# Patient Record
Sex: MALE | Race: WHITE | NOT HISPANIC OR LATINO | Employment: FULL TIME | URBAN - METROPOLITAN AREA
[De-identification: names, ages, dates, MRNs, and addresses within clinical notes are randomized per-mention and may not be internally consistent; named-entity substitution may affect disease eponyms.]

---

## 2017-01-08 DIAGNOSIS — F32.A DEPRESSION: ICD-10-CM

## 2017-01-09 RX ORDER — DULOXETIN HYDROCHLORIDE 60 MG/1
CAPSULE, DELAYED RELEASE ORAL
Qty: 90 CAPSULE | Refills: 0 | Status: SHIPPED | OUTPATIENT
Start: 2017-01-09 | End: 2017-02-08 | Stop reason: SDUPTHER

## 2017-02-08 DIAGNOSIS — F32.A DEPRESSION: ICD-10-CM

## 2017-02-09 RX ORDER — DULOXETIN HYDROCHLORIDE 60 MG/1
CAPSULE, DELAYED RELEASE ORAL
Qty: 90 CAPSULE | Refills: 0 | Status: SHIPPED | OUTPATIENT
Start: 2017-02-09 | End: 2017-03-12 | Stop reason: SDUPTHER

## 2017-03-12 DIAGNOSIS — F32.A DEPRESSION: ICD-10-CM

## 2017-03-13 RX ORDER — DULOXETIN HYDROCHLORIDE 60 MG/1
CAPSULE, DELAYED RELEASE ORAL
Qty: 90 CAPSULE | Refills: 0 | Status: SHIPPED | OUTPATIENT
Start: 2017-03-13 | End: 2017-04-09 | Stop reason: SDUPTHER

## 2017-04-09 DIAGNOSIS — F32.A DEPRESSION: ICD-10-CM

## 2017-04-10 RX ORDER — DULOXETIN HYDROCHLORIDE 60 MG/1
CAPSULE, DELAYED RELEASE ORAL
Qty: 90 CAPSULE | Refills: 0 | Status: SHIPPED | OUTPATIENT
Start: 2017-04-10 | End: 2017-05-08 | Stop reason: SDUPTHER

## 2017-05-08 DIAGNOSIS — F32.A DEPRESSION: ICD-10-CM

## 2017-05-08 RX ORDER — DULOXETIN HYDROCHLORIDE 60 MG/1
CAPSULE, DELAYED RELEASE ORAL
Qty: 90 CAPSULE | Refills: 0 | Status: SHIPPED | OUTPATIENT
Start: 2017-05-08 | End: 2017-08-15 | Stop reason: SDUPTHER

## 2017-08-14 DIAGNOSIS — F32.A DEPRESSION: ICD-10-CM

## 2017-08-14 RX ORDER — DULOXETIN HYDROCHLORIDE 60 MG/1
CAPSULE, DELAYED RELEASE ORAL
Qty: 90 CAPSULE | Refills: 0 | Status: CANCELLED | OUTPATIENT
Start: 2017-08-14

## 2017-08-15 DIAGNOSIS — F32.A DEPRESSION, UNSPECIFIED DEPRESSION TYPE: ICD-10-CM

## 2017-08-15 RX ORDER — DULOXETIN HYDROCHLORIDE 60 MG/1
60 CAPSULE, DELAYED RELEASE ORAL DAILY
Qty: 90 CAPSULE | Refills: 0 | Status: SHIPPED | OUTPATIENT
Start: 2017-08-15 | End: 2018-03-22 | Stop reason: SDUPTHER

## 2018-03-22 DIAGNOSIS — F32.A DEPRESSION, UNSPECIFIED DEPRESSION TYPE: ICD-10-CM

## 2018-03-22 RX ORDER — DULOXETIN HYDROCHLORIDE 60 MG/1
CAPSULE, DELAYED RELEASE ORAL
Qty: 90 CAPSULE | Refills: 0 | Status: SHIPPED | OUTPATIENT
Start: 2018-03-22 | End: 2018-08-16 | Stop reason: SDUPTHER

## 2018-08-16 DIAGNOSIS — F32.A DEPRESSION, UNSPECIFIED DEPRESSION TYPE: ICD-10-CM

## 2018-08-16 RX ORDER — DULOXETIN HYDROCHLORIDE 60 MG/1
CAPSULE, DELAYED RELEASE ORAL
Qty: 90 CAPSULE | Refills: 1 | Status: SHIPPED | OUTPATIENT
Start: 2018-08-16 | End: 2018-11-16 | Stop reason: SDUPTHER

## 2018-10-25 ENCOUNTER — TELEPHONE (OUTPATIENT)
Dept: SPORTS MEDICINE | Facility: CLINIC | Age: 53
End: 2018-10-25

## 2018-11-16 ENCOUNTER — OFFICE VISIT (OUTPATIENT)
Dept: SPORTS MEDICINE | Facility: CLINIC | Age: 53
End: 2018-11-16

## 2018-11-16 VITALS
DIASTOLIC BLOOD PRESSURE: 86 MMHG | WEIGHT: 189.6 LBS | TEMPERATURE: 98.1 F | BODY MASS INDEX: 25.68 KG/M2 | OXYGEN SATURATION: 98 % | HEART RATE: 64 BPM | HEIGHT: 72 IN | SYSTOLIC BLOOD PRESSURE: 122 MMHG

## 2018-11-16 DIAGNOSIS — F32.A DEPRESSION, UNSPECIFIED DEPRESSION TYPE: ICD-10-CM

## 2018-11-16 DIAGNOSIS — Z00.00 ANNUAL PHYSICAL EXAM: Primary | ICD-10-CM

## 2018-11-16 DIAGNOSIS — Z86.711 HISTORY OF PULMONARY EMBOLUS (PE): ICD-10-CM

## 2018-11-16 PROCEDURE — 99396 PREV VISIT EST AGE 40-64: CPT | Performed by: FAMILY MEDICINE

## 2018-11-16 RX ORDER — DULOXETIN HYDROCHLORIDE 60 MG/1
60 CAPSULE, DELAYED RELEASE ORAL DAILY
Qty: 90 CAPSULE | Refills: 3 | Status: SHIPPED | OUTPATIENT
Start: 2018-11-16 | End: 2019-12-31 | Stop reason: SDUPTHER

## 2018-11-16 NOTE — PROGRESS NOTES
"Elías Mccoy is here today for an annual physical exam.     Eating a healthy diet. Exercising routinely.     Was planning to see new cardiologist due to h/o PE Nov 2014, had no predisposing factors. Was told he had a clotting defect and needed Eliquis indefinitely due to that. Has tolerated well without SE.   Depression stable with Cymbalta w/o SE.     Health Maintenance   Topic Date Due   • TDAP/TD VACCINES (1 - Tdap) 05/17/1985   • ZOSTER VACCINE (1 of 2) 05/17/2016   • COLONOSCOPY  08/15/2017   • INFLUENZA VACCINE  08/01/2018   • ANNUAL PHYSICAL  11/17/2019       Review of Systems   Constitutional: Negative.    Respiratory: Negative.    Cardiovascular: Negative.        /86   Pulse 64   Temp 98.1 °F (36.7 °C)   Ht 182.9 cm (72\")   Wt 86 kg (189 lb 9.6 oz)   SpO2 98%   BMI 25.71 kg/m²      Physical Exam    Vital signs reviewed.  General appearance: No acute distress  Eyes: conjunctiva clear without erythema; pupils equally round and reactive  ENT: external ears and nose normal; hearing normal, oropharynx clear  Neck: supple; no thyromegaly  CV: normal rate and rhythm; no peripheral edema  Respiratory: normal respiratory effort; lungs clear to auscultation bilaterally  MSK: normal gait and station; no focal joint deformity or swelling  Skin: no rash or wounds; normal turgor  Neuro: cranial nerves 2-12 grossly intact; normal sensation to light touch  Psych: mood and affect normal; recent and remote memory intact     Elías was seen today for annual exam.    Diagnoses and all orders for this visit:    Annual physical exam  -     Ambulatory Referral For Screening Colonoscopy  -     CBC & Differential  -     Comprehensive Metabolic Panel  -     Lipid Panel With / Chol / HDL Ratio  -     PSA Screen  -     Thyroid Cascade Profile  -     Urinalysis With Culture If Indicated - Urine, Clean Catch    Depression, unspecified depression type  -     DULoxetine (CYMBALTA) 60 MG capsule; Take 1 capsule by mouth " Daily.    History of pulmonary embolus (PE)  -     apixaban (ELIQUIS) 2.5 MG tablet tablet; Take 1 tablet by mouth Every 12 (Twelve) Hours.    Other orders  -     Microscopic Examination -        Encourage healthy diet and exercise.  Encourage patient to stay up to date on screening examinations as indicated based on age and risk factors.  Will obtain old records regarding PE, continue eliquis.     EMR Dragon/Transcription disclaimer:    Much of this encounter note is an electronic transcription/translation of spoken language to printed text.  The electronic translation of spoken language may permit erroneous, or at times, nonsensical words or phrases to be inadvertently transcribed.  Although I have reviewed the note for such errors some may still exist.

## 2018-11-17 LAB
ALBUMIN SERPL-MCNC: 4.2 G/DL (ref 3.5–5.2)
ALBUMIN/GLOB SERPL: 1.6 G/DL
ALP SERPL-CCNC: 74 U/L (ref 39–117)
ALT SERPL-CCNC: 12 U/L (ref 1–41)
APPEARANCE UR: CLEAR
AST SERPL-CCNC: 13 U/L (ref 1–40)
BACTERIA #/AREA URNS HPF: NORMAL /HPF
BASOPHILS # BLD AUTO: 0.01 10*3/MM3 (ref 0–0.2)
BASOPHILS NFR BLD AUTO: 0.1 % (ref 0–1.5)
BILIRUB SERPL-MCNC: 0.7 MG/DL (ref 0.1–1.2)
BILIRUB UR QL STRIP: NEGATIVE
BUN SERPL-MCNC: 15 MG/DL (ref 6–20)
BUN/CREAT SERPL: 13 (ref 7–25)
CALCIUM SERPL-MCNC: 9.4 MG/DL (ref 8.6–10.5)
CHLORIDE SERPL-SCNC: 101 MMOL/L (ref 98–107)
CHOLEST SERPL-MCNC: 217 MG/DL (ref 0–200)
CHOLEST/HDLC SERPL: 4.09 {RATIO}
CO2 SERPL-SCNC: 27 MMOL/L (ref 22–29)
COLOR UR: YELLOW
CREAT SERPL-MCNC: 1.15 MG/DL (ref 0.76–1.27)
EOSINOPHIL # BLD AUTO: 0.17 10*3/MM3 (ref 0–0.7)
EOSINOPHIL NFR BLD AUTO: 2.1 % (ref 0.3–6.2)
EPI CELLS #/AREA URNS HPF: NORMAL /HPF
ERYTHROCYTE [DISTWIDTH] IN BLOOD BY AUTOMATED COUNT: 13.4 % (ref 11.5–14.5)
GLOBULIN SER CALC-MCNC: 2.7 GM/DL
GLUCOSE SERPL-MCNC: 88 MG/DL (ref 65–99)
GLUCOSE UR QL: NEGATIVE
HCT VFR BLD AUTO: 45.4 % (ref 40.4–52.2)
HDLC SERPL-MCNC: 53 MG/DL (ref 40–60)
HGB BLD-MCNC: 14.9 G/DL (ref 13.7–17.6)
HGB UR QL STRIP: NEGATIVE
IMM GRANULOCYTES # BLD: 0.02 10*3/MM3 (ref 0–0.03)
IMM GRANULOCYTES NFR BLD: 0.3 % (ref 0–0.5)
KETONES UR QL STRIP: NEGATIVE
LDLC SERPL CALC-MCNC: 138 MG/DL (ref 0–100)
LEUKOCYTE ESTERASE UR QL STRIP: NEGATIVE
LYMPHOCYTES # BLD AUTO: 1.81 10*3/MM3 (ref 0.9–4.8)
LYMPHOCYTES NFR BLD AUTO: 22.9 % (ref 19.6–45.3)
MCH RBC QN AUTO: 30 PG (ref 27–32.7)
MCHC RBC AUTO-ENTMCNC: 32.8 G/DL (ref 32.6–36.4)
MCV RBC AUTO: 91.5 FL (ref 79.8–96.2)
MICRO URNS: NORMAL
MICRO URNS: NORMAL
MONOCYTES # BLD AUTO: 0.43 10*3/MM3 (ref 0.2–1.2)
MONOCYTES NFR BLD AUTO: 5.4 % (ref 5–12)
MUCOUS THREADS URNS QL MICRO: PRESENT /HPF
NEUTROPHILS # BLD AUTO: 5.48 10*3/MM3 (ref 1.9–8.1)
NEUTROPHILS NFR BLD AUTO: 69.2 % (ref 42.7–76)
NITRITE UR QL STRIP: NEGATIVE
PH UR STRIP: 6 [PH] (ref 5–7.5)
PLATELET # BLD AUTO: 196 10*3/MM3 (ref 140–500)
POTASSIUM SERPL-SCNC: 4 MMOL/L (ref 3.5–5.2)
PROT SERPL-MCNC: 6.9 G/DL (ref 6–8.5)
PROT UR QL STRIP: NEGATIVE
PSA SERPL-MCNC: 2.9 NG/ML (ref 0–4)
RBC # BLD AUTO: 4.96 10*6/MM3 (ref 4.6–6)
RBC #/AREA URNS HPF: NORMAL /HPF
SODIUM SERPL-SCNC: 140 MMOL/L (ref 136–145)
SP GR UR: 1.02 (ref 1–1.03)
TRIGL SERPL-MCNC: 128 MG/DL (ref 0–150)
TSH SERPL DL<=0.005 MIU/L-ACNC: 3.48 UIU/ML (ref 0.45–4.5)
URINALYSIS REFLEX: NORMAL
UROBILINOGEN UR STRIP-MCNC: 1 MG/DL (ref 0.2–1)
VLDLC SERPL CALC-MCNC: 25.6 MG/DL (ref 5–40)
WBC # BLD AUTO: 7.92 10*3/MM3 (ref 4.5–10.7)
WBC #/AREA URNS HPF: NORMAL /HPF

## 2018-11-20 ENCOUNTER — TELEPHONE (OUTPATIENT)
Dept: SPORTS MEDICINE | Facility: CLINIC | Age: 53
End: 2018-11-20

## 2018-11-25 PROBLEM — Z86.711 HISTORY OF PULMONARY EMBOLUS (PE): Status: ACTIVE | Noted: 2018-11-25

## 2019-01-14 ENCOUNTER — TELEPHONE (OUTPATIENT)
Dept: SPORTS MEDICINE | Facility: CLINIC | Age: 54
End: 2019-01-14

## 2019-01-14 NOTE — TELEPHONE ENCOUNTER
PT called in stating that a referral was placed for a Cardiologist Arsalan Buckley but he actually lives in [place] and would like to see a Dr up there.     He requested that he gets referred to Dr. Albert Grullon. The number for their office is [phone].     Thank you.

## 2019-12-18 DIAGNOSIS — F32.A DEPRESSION, UNSPECIFIED DEPRESSION TYPE: ICD-10-CM

## 2019-12-18 RX ORDER — DULOXETIN HYDROCHLORIDE 60 MG/1
CAPSULE, DELAYED RELEASE ORAL
Qty: 90 CAPSULE | Refills: 3 | OUTPATIENT
Start: 2019-12-18

## 2019-12-31 ENCOUNTER — TELEPHONE (OUTPATIENT)
Dept: SPORTS MEDICINE | Facility: CLINIC | Age: 54
End: 2019-12-31

## 2019-12-31 DIAGNOSIS — F32.A DEPRESSION, UNSPECIFIED DEPRESSION TYPE: ICD-10-CM

## 2019-12-31 DIAGNOSIS — Z86.711 HISTORY OF PULMONARY EMBOLUS (PE): ICD-10-CM

## 2019-12-31 RX ORDER — DULOXETIN HYDROCHLORIDE 60 MG/1
60 CAPSULE, DELAYED RELEASE ORAL DAILY
Qty: 30 CAPSULE | Refills: 0 | Status: SHIPPED | OUTPATIENT
Start: 2019-12-31 | End: 2020-01-24 | Stop reason: SDUPTHER

## 2020-01-24 ENCOUNTER — OFFICE VISIT (OUTPATIENT)
Dept: SPORTS MEDICINE | Facility: CLINIC | Age: 55
End: 2020-01-24

## 2020-01-24 ENCOUNTER — RESULTS ENCOUNTER (OUTPATIENT)
Dept: SPORTS MEDICINE | Facility: CLINIC | Age: 55
End: 2020-01-24

## 2020-01-24 VITALS
HEIGHT: 72 IN | BODY MASS INDEX: 25.6 KG/M2 | SYSTOLIC BLOOD PRESSURE: 112 MMHG | WEIGHT: 189 LBS | DIASTOLIC BLOOD PRESSURE: 80 MMHG | OXYGEN SATURATION: 98 % | HEART RATE: 100 BPM

## 2020-01-24 DIAGNOSIS — F32.A DEPRESSION, UNSPECIFIED DEPRESSION TYPE: ICD-10-CM

## 2020-01-24 DIAGNOSIS — Z00.00 ANNUAL PHYSICAL EXAM: Primary | ICD-10-CM

## 2020-01-24 DIAGNOSIS — Z86.711 HISTORY OF PULMONARY EMBOLUS (PE): ICD-10-CM

## 2020-01-24 DIAGNOSIS — Z00.00 ANNUAL PHYSICAL EXAM: ICD-10-CM

## 2020-01-24 PROCEDURE — 99396 PREV VISIT EST AGE 40-64: CPT | Performed by: FAMILY MEDICINE

## 2020-01-24 RX ORDER — DULOXETIN HYDROCHLORIDE 60 MG/1
60 CAPSULE, DELAYED RELEASE ORAL DAILY
Qty: 90 CAPSULE | Refills: 3 | Status: SHIPPED | OUTPATIENT
Start: 2020-01-24 | End: 2020-08-11

## 2020-01-24 NOTE — PROGRESS NOTES
"Elías Mccoy is here today for an annual physical exam.     Eating a healthy diet. Exercising routinely - spin and run 3-4 days per week.   2nd PE this past year, more consistent with eliquis now. Seeing specialist in [place] (hospitalized at [place] Jan 2019).   Depression well controlled, cymbalta helps maintain sleep.     PHQ-2 Depression Screening  Little interest or pleasure in doing things? 0   Feeling down, depressed, or hopeless? 0   PHQ-2 Total Score 0        Health Maintenance   Topic Date Due   • TDAP/TD VACCINES (1 - Tdap) 05/17/1977   • ZOSTER VACCINE (1 of 2) 05/17/2016   • COLONOSCOPY  08/15/2017   • INFLUENZA VACCINE  08/01/2019   • ANNUAL PHYSICAL  01/25/2021       Review of Systems   Constitutional: Negative.    Respiratory: Negative.    Cardiovascular: Negative.        /80   Pulse 100   Ht 182.9 cm (72.01\")   Wt 85.7 kg (189 lb)   SpO2 98%   BMI 25.63 kg/m²      Physical Exam    Vital signs reviewed.  General appearance: No acute distress  Eyes: conjunctiva clear without erythema; pupils equally round and reactive  ENT: external ears and nose normal; hearing normal, oropharynx clear  Neck: supple; no thyromegaly  CV: normal rate and rhythm; no peripheral edema  Respiratory: normal respiratory effort; lungs clear to auscultation bilaterally  MSK: normal gait and station; no focal joint deformity or swelling  Skin: no rash or wounds; normal turgor  Neuro: cranial nerves 2-12 grossly intact; normal sensation to light touch  Psych: mood and affect normal; recent and remote memory intact       Current Outpatient Medications:   •  apixaban (ELIQUIS) 2.5 MG tablet tablet, Take 1 tablet by mouth Every 12 (Twelve) Hours., Disp: 180 tablet, Rfl: 3  •  DULoxetine (CYMBALTA) 60 MG capsule, Take 1 capsule by mouth Daily., Disp: 90 capsule, Rfl: 3    Elías was seen today for annual exam.    Diagnoses and all orders for this visit:    Annual physical exam  -     CBC & Differential  -     " Comprehensive Metabolic Panel  -     Lipid Panel With / Chol / HDL Ratio  -     Thyroid Cascade Profile  -     Urinalysis With Culture If Indicated -  -     PSA Screen  -     Cologuard - Stool, Per Rectum; Future    Depression, unspecified depression type  -     DULoxetine (CYMBALTA) 60 MG capsule; Take 1 capsule by mouth Daily.    History of pulmonary embolus (PE)  -     apixaban (ELIQUIS) 2.5 MG tablet tablet; Take 1 tablet by mouth Every 12 (Twelve) Hours.    Other orders  -     Microscopic Examination -        Encourage healthy diet and exercise.  Encourage patient to stay up to date on screening examinations as indicated based on age and risk factors.      EMR Dragon/Transcription disclaimer:    Much of this encounter note is an electronic transcription/translation of spoken language to printed text.  The electronic translation of spoken language may permit erroneous, or at times, nonsensical words or phrases to be inadvertently transcribed.  Although I have reviewed the note for such errors some may still exist.

## 2020-01-25 LAB
ALBUMIN SERPL-MCNC: 4.3 G/DL (ref 3.5–5.2)
ALBUMIN/GLOB SERPL: 2 G/DL
ALP SERPL-CCNC: 61 U/L (ref 39–117)
ALT SERPL-CCNC: 16 U/L (ref 1–41)
APPEARANCE UR: CLEAR
AST SERPL-CCNC: 18 U/L (ref 1–40)
BACTERIA #/AREA URNS HPF: ABNORMAL /HPF
BASOPHILS # BLD AUTO: 0.01 10*3/MM3 (ref 0–0.2)
BASOPHILS NFR BLD AUTO: 0.2 % (ref 0–1.5)
BILIRUB SERPL-MCNC: 0.4 MG/DL (ref 0.2–1.2)
BILIRUB UR QL STRIP: NEGATIVE
BUN SERPL-MCNC: 15 MG/DL (ref 6–20)
BUN/CREAT SERPL: 14.6 (ref 7–25)
CALCIUM SERPL-MCNC: 9.2 MG/DL (ref 8.6–10.5)
CASTS URNS MICRO: ABNORMAL
CASTS URNS QL MICRO: PRESENT /LPF
CHLORIDE SERPL-SCNC: 103 MMOL/L (ref 98–107)
CHOLEST SERPL-MCNC: 198 MG/DL (ref 0–200)
CHOLEST/HDLC SERPL: 4.21 {RATIO}
CO2 SERPL-SCNC: 27.1 MMOL/L (ref 22–29)
COLOR UR: YELLOW
CREAT SERPL-MCNC: 1.03 MG/DL (ref 0.76–1.27)
EOSINOPHIL # BLD AUTO: 0.06 10*3/MM3 (ref 0–0.4)
EOSINOPHIL NFR BLD AUTO: 1.4 % (ref 0.3–6.2)
EPI CELLS #/AREA URNS HPF: ABNORMAL /HPF (ref 0–10)
ERYTHROCYTE [DISTWIDTH] IN BLOOD BY AUTOMATED COUNT: 12.7 % (ref 12.3–15.4)
GLOBULIN SER CALC-MCNC: 2.1 GM/DL
GLUCOSE SERPL-MCNC: 89 MG/DL (ref 65–99)
GLUCOSE UR QL: NEGATIVE
HCT VFR BLD AUTO: 44.1 % (ref 37.5–51)
HDLC SERPL-MCNC: 47 MG/DL (ref 40–60)
HGB BLD-MCNC: 15.3 G/DL (ref 13–17.7)
HGB UR QL STRIP: NEGATIVE
IMM GRANULOCYTES # BLD AUTO: 0.01 10*3/MM3 (ref 0–0.05)
IMM GRANULOCYTES NFR BLD AUTO: 0.2 % (ref 0–0.5)
KETONES UR QL STRIP: NEGATIVE
LDLC SERPL CALC-MCNC: 88 MG/DL (ref 0–100)
LEUKOCYTE ESTERASE UR QL STRIP: NEGATIVE
LYMPHOCYTES # BLD AUTO: 0.82 10*3/MM3 (ref 0.7–3.1)
LYMPHOCYTES NFR BLD AUTO: 19.2 % (ref 19.6–45.3)
MCH RBC QN AUTO: 30.8 PG (ref 26.6–33)
MCHC RBC AUTO-ENTMCNC: 34.7 G/DL (ref 31.5–35.7)
MCV RBC AUTO: 88.7 FL (ref 79–97)
MICRO URNS: NORMAL
MICRO URNS: NORMAL
MONOCYTES # BLD AUTO: 0.55 10*3/MM3 (ref 0.1–0.9)
MONOCYTES NFR BLD AUTO: 12.9 % (ref 5–12)
MUCOUS THREADS URNS QL MICRO: PRESENT /HPF
NEUTROPHILS # BLD AUTO: 2.82 10*3/MM3 (ref 1.7–7)
NEUTROPHILS NFR BLD AUTO: 66.1 % (ref 42.7–76)
NITRITE UR QL STRIP: NEGATIVE
NRBC BLD AUTO-RTO: 0 /100 WBC (ref 0–0.2)
PH UR STRIP: 5 [PH] (ref 5–7.5)
PLATELET # BLD AUTO: 196 10*3/MM3 (ref 140–450)
POTASSIUM SERPL-SCNC: 4.2 MMOL/L (ref 3.5–5.2)
PROT SERPL-MCNC: 6.4 G/DL (ref 6–8.5)
PROT UR QL STRIP: NEGATIVE
PSA SERPL-MCNC: 3.01 NG/ML (ref 0–4)
RBC # BLD AUTO: 4.97 10*6/MM3 (ref 4.14–5.8)
RBC #/AREA URNS HPF: ABNORMAL /HPF (ref 0–2)
SODIUM SERPL-SCNC: 142 MMOL/L (ref 136–145)
SP GR UR: 1.03 (ref 1–1.03)
TRIGL SERPL-MCNC: 314 MG/DL (ref 0–150)
TSH SERPL DL<=0.005 MIU/L-ACNC: 3.26 UIU/ML (ref 0.45–4.5)
URINALYSIS REFLEX: NORMAL
UROBILINOGEN UR STRIP-MCNC: 1 MG/DL (ref 0.2–1)
VLDLC SERPL CALC-MCNC: 62.8 MG/DL
WBC # BLD AUTO: 4.27 10*3/MM3 (ref 3.4–10.8)
WBC #/AREA URNS HPF: ABNORMAL /HPF (ref 0–5)

## 2020-01-28 ENCOUNTER — TELEPHONE (OUTPATIENT)
Dept: SPORTS MEDICINE | Facility: CLINIC | Age: 55
End: 2020-01-28

## 2020-01-28 NOTE — TELEPHONE ENCOUNTER
Patient is aware of results.  No questions        ----- Message from Theo Brennan MD sent at 1/27/2020  9:04 AM EST -----  Labs are all stable.  Triglycerides are mildly high, likely related to nonfasting collection.  Keep up the good work.

## 2020-08-11 DIAGNOSIS — F32.A DEPRESSION, UNSPECIFIED DEPRESSION TYPE: ICD-10-CM

## 2020-08-11 RX ORDER — DULOXETIN HYDROCHLORIDE 60 MG/1
CAPSULE, DELAYED RELEASE ORAL
Qty: 90 CAPSULE | Refills: 0 | Status: SHIPPED | OUTPATIENT
Start: 2020-08-11 | End: 2020-12-04 | Stop reason: SDUPTHER

## 2020-12-04 ENCOUNTER — TELEPHONE (OUTPATIENT)
Dept: SPORTS MEDICINE | Facility: CLINIC | Age: 55
End: 2020-12-04

## 2020-12-04 DIAGNOSIS — F32.A DEPRESSION, UNSPECIFIED DEPRESSION TYPE: ICD-10-CM

## 2020-12-04 RX ORDER — DULOXETIN HYDROCHLORIDE 60 MG/1
60 CAPSULE, DELAYED RELEASE ORAL DAILY
Qty: 90 CAPSULE | Refills: 3 | Status: SHIPPED | OUTPATIENT
Start: 2020-12-04 | End: 2022-01-14

## 2021-09-07 ENCOUNTER — TELEPHONE (OUTPATIENT)
Dept: SPORTS MEDICINE | Facility: CLINIC | Age: 56
End: 2021-09-07

## 2021-09-07 NOTE — TELEPHONE ENCOUNTER
Provider: DR IRVIN ACEVES    Caller: DINESH KIM     Relationship to Patient: SELF    Phone Number: [phone]    Reason for Call: PT CALLED IN NEEDING INFO ON THE BLOOD CLOTTING SPECIALIST DR ACEVES HAD REFERRED HIM TO A FEW YEARS BACK , NOT SEEING ANY ORDERS IN THE CHART - PLEASE ADVISE     When was the patient last seen: 1/24/2020

## 2021-09-07 NOTE — TELEPHONE ENCOUNTER
Called and spoke with patient, informed only records on file was a Chandler White MD in [facility], and a Bhaskar Gross MD in [place], [place]. He was looking for the physician in [place], [place]. Provided name to and office where he was seen at.   All information was verbally understood.     Thanks  Carmen

## 2022-01-14 DIAGNOSIS — F32.A DEPRESSION, UNSPECIFIED DEPRESSION TYPE: ICD-10-CM

## 2022-01-14 RX ORDER — DULOXETIN HYDROCHLORIDE 60 MG/1
CAPSULE, DELAYED RELEASE ORAL
Qty: 90 CAPSULE | Refills: 0 | Status: SHIPPED | OUTPATIENT
Start: 2022-01-14 | End: 2022-02-25 | Stop reason: SDUPTHER

## 2022-02-25 ENCOUNTER — OFFICE VISIT (OUTPATIENT)
Dept: SPORTS MEDICINE | Facility: CLINIC | Age: 57
End: 2022-02-25

## 2022-02-25 VITALS
HEIGHT: 72 IN | DIASTOLIC BLOOD PRESSURE: 78 MMHG | TEMPERATURE: 97.8 F | RESPIRATION RATE: 16 BRPM | HEART RATE: 61 BPM | WEIGHT: 185 LBS | SYSTOLIC BLOOD PRESSURE: 128 MMHG | OXYGEN SATURATION: 99 % | BODY MASS INDEX: 25.06 KG/M2

## 2022-02-25 DIAGNOSIS — S46.001A ROTATOR CUFF INJURY, RIGHT, INITIAL ENCOUNTER: ICD-10-CM

## 2022-02-25 DIAGNOSIS — Z00.00 ANNUAL PHYSICAL EXAM: Primary | ICD-10-CM

## 2022-02-25 DIAGNOSIS — F32.A DEPRESSION, UNSPECIFIED DEPRESSION TYPE: ICD-10-CM

## 2022-02-25 DIAGNOSIS — M25.511 CHRONIC RIGHT SHOULDER PAIN: ICD-10-CM

## 2022-02-25 DIAGNOSIS — Z12.5 SCREENING FOR PROSTATE CANCER: ICD-10-CM

## 2022-02-25 DIAGNOSIS — G89.29 CHRONIC RIGHT SHOULDER PAIN: ICD-10-CM

## 2022-02-25 PROCEDURE — 73030 X-RAY EXAM OF SHOULDER: CPT | Performed by: FAMILY MEDICINE

## 2022-02-25 PROCEDURE — 99396 PREV VISIT EST AGE 40-64: CPT | Performed by: FAMILY MEDICINE

## 2022-02-25 RX ORDER — DULOXETIN HYDROCHLORIDE 60 MG/1
60 CAPSULE, DELAYED RELEASE ORAL DAILY
Qty: 90 CAPSULE | Refills: 3 | Status: SHIPPED | OUTPATIENT
Start: 2022-02-25 | End: 2023-04-03

## 2022-02-26 LAB
ALBUMIN SERPL-MCNC: 4.2 G/DL (ref 3.8–4.9)
ALBUMIN/GLOB SERPL: 1.8 {RATIO} (ref 1.2–2.2)
ALP SERPL-CCNC: 74 IU/L (ref 44–121)
ALT SERPL-CCNC: 12 IU/L (ref 0–44)
APPEARANCE UR: CLEAR
AST SERPL-CCNC: 17 IU/L (ref 0–40)
BACTERIA #/AREA URNS HPF: NORMAL /[HPF]
BASOPHILS # BLD AUTO: 0 X10E3/UL (ref 0–0.2)
BASOPHILS NFR BLD AUTO: 0 %
BILIRUB SERPL-MCNC: 0.6 MG/DL (ref 0–1.2)
BILIRUB UR QL STRIP: NEGATIVE
BUN SERPL-MCNC: 17 MG/DL (ref 6–24)
BUN/CREAT SERPL: 15 (ref 9–20)
CALCIUM SERPL-MCNC: 9.1 MG/DL (ref 8.7–10.2)
CASTS URNS QL MICRO: NORMAL /LPF
CHLORIDE SERPL-SCNC: 105 MMOL/L (ref 96–106)
CHOLEST SERPL-MCNC: 229 MG/DL (ref 100–199)
CHOLEST/HDLC SERPL: 3.9 RATIO (ref 0–5)
CO2 SERPL-SCNC: 25 MMOL/L (ref 20–29)
COLOR UR: YELLOW
CREAT SERPL-MCNC: 1.1 MG/DL (ref 0.76–1.27)
EOSINOPHIL # BLD AUTO: 0.1 X10E3/UL (ref 0–0.4)
EOSINOPHIL NFR BLD AUTO: 2 %
EPI CELLS #/AREA URNS HPF: NORMAL /HPF (ref 0–10)
ERYTHROCYTE [DISTWIDTH] IN BLOOD BY AUTOMATED COUNT: 12.7 % (ref 11.6–15.4)
GLOBULIN SER CALC-MCNC: 2.4 G/DL (ref 1.5–4.5)
GLUCOSE SERPL-MCNC: 89 MG/DL (ref 65–99)
GLUCOSE UR QL STRIP: NEGATIVE
HBA1C MFR BLD: 5 % (ref 4.8–5.6)
HCT VFR BLD AUTO: 43.3 % (ref 37.5–51)
HDLC SERPL-MCNC: 58 MG/DL
HGB BLD-MCNC: 14.9 G/DL (ref 13–17.7)
HGB UR QL STRIP: NEGATIVE
IMM GRANULOCYTES # BLD AUTO: 0 X10E3/UL (ref 0–0.1)
IMM GRANULOCYTES NFR BLD AUTO: 0 %
KETONES UR QL STRIP: NEGATIVE
LDLC SERPL CALC-MCNC: 154 MG/DL (ref 0–99)
LEUKOCYTE ESTERASE UR QL STRIP: NEGATIVE
LYMPHOCYTES # BLD AUTO: 1.7 X10E3/UL (ref 0.7–3.1)
LYMPHOCYTES NFR BLD AUTO: 31 %
MCH RBC QN AUTO: 29.9 PG (ref 26.6–33)
MCHC RBC AUTO-ENTMCNC: 34.4 G/DL (ref 31.5–35.7)
MCV RBC AUTO: 87 FL (ref 79–97)
MICRO URNS: NORMAL
MICRO URNS: NORMAL
MONOCYTES # BLD AUTO: 0.4 X10E3/UL (ref 0.1–0.9)
MONOCYTES NFR BLD AUTO: 7 %
NEUTROPHILS # BLD AUTO: 3.3 X10E3/UL (ref 1.4–7)
NEUTROPHILS NFR BLD AUTO: 60 %
NITRITE UR QL STRIP: NEGATIVE
PH UR STRIP: 5.5 [PH] (ref 5–7.5)
PLATELET # BLD AUTO: 217 X10E3/UL (ref 150–450)
POTASSIUM SERPL-SCNC: 4.3 MMOL/L (ref 3.5–5.2)
PROT SERPL-MCNC: 6.6 G/DL (ref 6–8.5)
PROT UR QL STRIP: NEGATIVE
PSA SERPL-MCNC: 6.2 NG/ML (ref 0–4)
RBC # BLD AUTO: 4.99 X10E6/UL (ref 4.14–5.8)
RBC #/AREA URNS HPF: NORMAL /HPF (ref 0–2)
SODIUM SERPL-SCNC: 143 MMOL/L (ref 134–144)
SP GR UR STRIP: 1.02 (ref 1–1.03)
TRIGL SERPL-MCNC: 94 MG/DL (ref 0–149)
TSH SERPL DL<=0.005 MIU/L-ACNC: 3.66 UIU/ML (ref 0.45–4.5)
URINALYSIS REFLEX: NORMAL
UROBILINOGEN UR STRIP-MCNC: 0.2 MG/DL (ref 0.2–1)
VLDLC SERPL CALC-MCNC: 17 MG/DL (ref 5–40)
WBC # BLD AUTO: 5.5 X10E3/UL (ref 3.4–10.8)
WBC #/AREA URNS HPF: NORMAL /HPF (ref 0–5)

## 2022-03-01 ENCOUNTER — TELEPHONE (OUTPATIENT)
Dept: SPORTS MEDICINE | Facility: CLINIC | Age: 57
End: 2022-03-01

## 2022-03-01 NOTE — TELEPHONE ENCOUNTER
Patient called in and left a voicemail stating he was supposed to have a referral for an MRI of the RT shoulder, he was checking on the status for that.   I did not see this referral/order in chart, is this OK to order for the patient?    Thank you  Carmen

## 2022-03-02 NOTE — TELEPHONE ENCOUNTER
I called and spoke with patient, informed order placed. He requested for imaging to be done closer to his home in [place], updated referral with this information.     Thanks  Carmen

## 2022-03-03 DIAGNOSIS — R97.20 ELEVATED PSA: Primary | ICD-10-CM

## 2022-03-22 ENCOUNTER — APPOINTMENT (OUTPATIENT)
Dept: MRI IMAGING | Facility: HOSPITAL | Age: 57
End: 2022-03-22

## 2022-03-22 ENCOUNTER — HOSPITAL ENCOUNTER (OUTPATIENT)
Dept: GENERAL RADIOLOGY | Facility: HOSPITAL | Age: 57
End: 2022-03-22

## 2022-03-25 ENCOUNTER — LAB (OUTPATIENT)
Dept: LAB | Facility: HOSPITAL | Age: 57
End: 2022-03-25

## 2022-03-25 ENCOUNTER — TRANSCRIBE ORDERS (OUTPATIENT)
Dept: LAB | Facility: HOSPITAL | Age: 57
End: 2022-03-25

## 2022-03-25 ENCOUNTER — OFFICE VISIT (OUTPATIENT)
Dept: UROLOGY | Facility: CLINIC | Age: 57
End: 2022-03-25

## 2022-03-25 VITALS
WEIGHT: 185 LBS | BODY MASS INDEX: 25.06 KG/M2 | OXYGEN SATURATION: 98 % | HEART RATE: 64 BPM | DIASTOLIC BLOOD PRESSURE: 78 MMHG | SYSTOLIC BLOOD PRESSURE: 124 MMHG | HEIGHT: 72 IN

## 2022-03-25 DIAGNOSIS — R97.20 ELEVATED PROSTATE SPECIFIC ANTIGEN (PSA): Primary | ICD-10-CM

## 2022-03-25 LAB
BILIRUB BLD-MCNC: NEGATIVE MG/DL
CLARITY, POC: CLEAR
COLOR UR: YELLOW
EXPIRATION DATE: NORMAL
GLUCOSE UR STRIP-MCNC: NEGATIVE MG/DL
KETONES UR QL: NEGATIVE
LEUKOCYTE EST, POC: NEGATIVE
Lab: NORMAL
NITRITE UR-MCNC: NEGATIVE MG/ML
PH UR: 6 [PH] (ref 5–8)
PROT UR STRIP-MCNC: NEGATIVE MG/DL
RBC # UR STRIP: NEGATIVE /UL
SP GR UR: 1.03 (ref 1–1.03)
UROBILINOGEN UR QL: NORMAL

## 2022-03-25 PROCEDURE — 99204 OFFICE O/P NEW MOD 45 MIN: CPT | Performed by: STUDENT IN AN ORGANIZED HEALTH CARE EDUCATION/TRAINING PROGRAM

## 2022-03-25 PROCEDURE — 51798 US URINE CAPACITY MEASURE: CPT | Performed by: STUDENT IN AN ORGANIZED HEALTH CARE EDUCATION/TRAINING PROGRAM

## 2022-03-25 PROCEDURE — 81003 URINALYSIS AUTO W/O SCOPE: CPT | Performed by: STUDENT IN AN ORGANIZED HEALTH CARE EDUCATION/TRAINING PROGRAM

## 2022-03-25 NOTE — PROGRESS NOTES
Elevated PSA Office Visit      Patient Name: Elías Mccoy  : 1965   MRN: [id number]     Chief Complaint:  Elevated PSA.    Chief Complaint   Patient presents with   • New Patient   • Elevated PSA       Referring Provider: No ref. provider found    History of Present Illness: Elías Mccoy is a 56 y.o. male who presents today with history of elevated PSA.  The patient's PSA trend is below.    Lab Results   Component Value Date    PSA 6.2 (H) 2022    PSA 3.010 2020    PSA 2.900 2018        Prostate Biopsy Collaborative Group (PBCG) Risk Calculator:   19% risk high grade cancer  21% low grade cancer  60% negative biopsy     He does not have any family history of prostate cancer that he is aware of.  He has a history of pulmonary embolus he is on Eliquis for this.    The patient is a never smoker, he has no significant urinary symptoms, his IPSS score is 12, he has no erectile dysfunction, Tin score is 23.     He has never been told he has an abnormal digital rectal examination.    Subjective      Review of System: Review of Systems   Constitutional: Negative for chills, fatigue, fever and unexpected weight change.   HENT: Negative for sore throat.    Eyes: Negative for visual disturbance.   Respiratory: Negative for cough, chest tightness and shortness of breath.    Cardiovascular: Negative for chest pain and leg swelling.   Gastrointestinal: Negative for blood in stool, constipation, diarrhea, nausea, rectal pain and vomiting.   Genitourinary: Negative for decreased urine volume, difficulty urinating, dysuria, enuresis, flank pain, frequency, genital sores, hematuria and urgency.   Musculoskeletal: Negative for back pain and joint swelling.   Skin: Negative for rash and wound.   Neurological: Negative for seizures, speech difficulty, weakness and headaches.   Psychiatric/Behavioral: Negative for confusion, sleep disturbance and suicidal ideas. The patient is not nervous/anxious.        I have reviewed the ROS documented by my clinical staff, I have updated appropriately and I agree. Paul Moon MD    Past Medical History:   Past Medical History:   Diagnosis Date   • Depression        Past Surgical History:   Past Surgical History:   Procedure Laterality Date   • KNEE SURGERY Right        Family History:   Family History   Problem Relation Age of Onset   • Alcohol abuse Mother    • Heart attack Father         Acute   • Alcohol abuse Father    • Hypertension Father    • Kidney disease Father    • Cancer Other    • Stroke Other         Ischemic       Social History:   Social History     Socioeconomic History   • Marital status:    Tobacco Use   • Smoking status: Never Smoker   • Smokeless tobacco: Never Used   Vaping Use   • Vaping Use: Never used   Substance and Sexual Activity   • Alcohol use: Yes     Comment: Social use   • Drug use: Never   • Sexual activity: Not Currently       Medications:     Current Outpatient Medications:   •  apixaban (ELIQUIS) 2.5 MG tablet tablet, Take 1 tablet by mouth Every 12 (Twelve) Hours., Disp: 180 tablet, Rfl: 3  •  DULoxetine (CYMBALTA) 60 MG capsule, Take 1 capsule by mouth Daily., Disp: 90 capsule, Rfl: 3    Allergies:   No Known Allergies    IPSS Questionnaire (AUA-7):  Over the past month…    1)  Incomplete Emptying:       How often have you had a sensation of not emptying you had the sensation of not emptying your bladder completely after you finished urinating?  1 - Less than 1 time in 5   2)  Frequency:       How often have you had the urinate again less than two hours after you finished urinating?  1 - Less than 1 time in 5   3)  Intermittency:       How often have you found you stopped and started again several times when you urinated?   2 - Less than half the time   4) Urgency:      How often have you found it difficult to postpone urination?  2 - Less than half the time   5) Weak Stream:      How often have you had a weak urinary  "stream?  1 - Less than 1 time in 5   6) Straining:       How often have you had to push or strain to begin urination?  2 - Less than half the time   7) Nocturia:      How many times did you most typically get up to urinate from the time you went to bed at night until the time you got up in the morning?  2 - 2 times   Total Score:  12   The International Prostate Symptom Score (IPSS) is used to screen, diagnose, track symptoms of benign prostatic hyperplasia (BPH).   0-7 (Mild Symptoms) 8-19 (Moderate) 20-35 (Severe)   Quality of Life (QoL):  If you were to spend the rest of your life with your urinary condition just the way it is now, how would you feel about that? 2-Mostly Satisfied   Urine Leakage (Incontinence) 0-No Leakage       Sexual Health Inventory for Men (SAFIA)   Over the past 6 months:     1. How do you rate your confidence that you could get and keep an erection?  5 - Very high    2. When you had erections with sexual    stimulation, how often were your erections hard enough for penetration (entering your partner)?  4 - Most times ( much more than, half the time)   3. During sexual intercourse, how often were you able to maintain your erection after you had penetrated (entered) your partner?  4 - Most times ( much more than, half the time)   4. During sexual intercourse, how difficult was it to maintain your erection to completion of intercourse?  5 - Not difficult    5. When you attempted sexual intercourse, how often was it satisfactory for you?  5 - Almost always or always     Total Score: 23   The Sexual Health Inventory for Men further classifies ED severity with the following breakpoints:   1-7 (Severe ED) 8-11 (Moderate ED) 12-16 (Mild to Moderate ED) 17-21 (Mild ED)      Post void residual bladder scan:   63 mL       Objective     Physical Exam:   Vital Signs:   Vitals:    03/25/22 1226   BP: 124/78   Pulse: 64   SpO2: 98%   Weight: 83.9 kg (185 lb)   Height: 182.9 cm (72.01\")     Body mass index " is 25.08 kg/m².     Physical Exam  Vitals and nursing note reviewed.   Constitutional:       Appearance: Normal appearance.   HENT:      Head: Normocephalic and atraumatic.      Mouth/Throat:      Mouth: Mucous membranes are moist.      Pharynx: Oropharynx is clear.   Eyes:      Extraocular Movements: Extraocular movements intact.      Conjunctiva/sclera: Conjunctivae normal.   Cardiovascular:      Rate and Rhythm: Normal rate and regular rhythm.   Pulmonary:      Effort: Pulmonary effort is normal. No respiratory distress.   Abdominal:      Palpations: Abdomen is soft.      Tenderness: There is no abdominal tenderness. There is no right CVA tenderness or left CVA tenderness.   Genitourinary:     Comments:  Circumcised phallus, orthotopic meatus, bilaterally descended testicles without masses, or lesions.     MICHELLE: Normal rectal tone, no blood, estimated 55 gram prostate without nodularity or firmness.   Musculoskeletal:         General: Normal range of motion.      Cervical back: Normal range of motion.   Skin:     General: Skin is warm and dry.   Neurological:      General: No focal deficit present.      Mental Status: He is alert and oriented to person, place, and time.   Psychiatric:         Mood and Affect: Mood normal.         Behavior: Behavior normal.         Labs:   Hematocrit (%)   Date Value   02/25/2022 43.3   01/24/2020 44.1   11/16/2018 45.4       Lab Results   Component Value Date    PSA 6.2 (H) 02/25/2022    PSA 3.010 01/24/2020    PSA 2.900 11/16/2018       Images:   XR Shoulder 2+ View Right    Result Date: 2/25/2022  Ordering physician's impression is located in the Encounter Note dated 02/25/22.        Measures:   Tobacco:   Elías Mccoy  reports that he has never smoked. He has never used smokeless tobacco.             Assessment / Plan      Assessment/Plan:   Mr. Mccoy is a 56 y.o. male with elevated PSA.    I had a detailed discussion with the patient today regarding prostate-specific  "antigen (PSA) and prostate cancer screening.  We discussed that PSA is an imperfect screening test and that it can be elevated for a variety of reasons including infection, inflammation, as a function of the prostate volume, and due to malignancy.  We discussed how prostate cancer is the most commonly diagnosed malignancy among men and becomes more prevalent at advanced age.  We discussed how patients with a family history of prostate cancer are at an increased risk of developing prostate cancer over the general population.  I counseled the patient that the American Urological Association guidelines recommend PSA screening in men aged 55 through 70, or in some instances at an earlier age if positive family history for prostate cancer.  I discussed how screening men older than 70 years old is not recommended, unless a patient has a greater than 10-year life expectancy, is in good health, and is personally motivated to rule out prostate cancer; this is due to the fact that most men older than 70, in poor health, and in those men with less than 10-year life expectancy will likely die of unrelated causes not associated with prostate cancer.  We talked about how prostate cancer is typically a slow-growing malignancy, but identifying intermediate or high risk prostate cancers that need prompt treatment is the ultimate goal of PSA and prostate cancer screening.  I discussed with this patient that there is no \"normal\" PSA range despite the fact that most labs indicate a \"normal\" PSA range from 0 to 4 ng/dL.  There are age-adjusted PSA ranges that are also taken into account in the setting of slightly elevated PSA in the older male.  Besides age-adjusted ranges, calculating a patient's prostate volume to determine PSA density (<0.15 has a lower risk of harboring malignancy), calculating the patient's PSA velocity or doubling time, and evaluating free PSA versus total PSA values can help guide our decision making.  I also " discussed the possibility of performing adjunctive blood tests as well, my preference is to perform a 4K score which can provide a percentage risk of harboring intermediate or high risk prostate cancers that may need treatment.  This is sometimes beneficial in assisting with decision-making in patients who are hesitant to undergo prostate biopsy.  I also discussed my goal is to work-up the appropriate patient for elevated PSA as prostate biopsy and work-up for prostate cancer has inherent risks and potential harms.  The risk of prostate biopsy related sepsis is 4% for all comers.    In short, I discussed that PSA screening and work-up for prostate cancer should only ever occur after shared decision making conversation between the physician and patient.  Patient reports understanding.    Discussion summary  I had a long discussion with the patient today, given his young age, mildly elevated PSA, we will further characterize his risk with adjunct of blood test with 4K score.  I will call him to discuss results, if equivocally elevated will recommend upfront MRI prostate for further evaluation which aids in more accurate prostate biopsy if necessary in the future.  He is on Eliquis for history of PE, this would need to be held prior to any prostate biopsy.      Diagnoses and all orders for this visit:    1. Elevated prostate specific antigen (PSA) (Primary)    - will call patient with 4K score results  - discussed possible upfront MRI to further evaluate risk if necessary.    -     POC Urinalysis Dipstick, Automated            Follow Up:   No follow-ups on file.    I spent approximately 45 minutes providing clinical care for this patient; including review of patient's chart and provider documentation, face to face time spent with patient in examination room (obtaining history, performing physical exam, discussing diagnosis and management options), placing orders, and completing patient documentation.     Paul HAMPTON  MD [place]  [facility] [place]

## 2022-04-01 ENCOUNTER — TELEPHONE (OUTPATIENT)
Dept: UROLOGY | Facility: CLINIC | Age: 57
End: 2022-04-01

## 2022-04-01 DIAGNOSIS — R97.20 ELEVATED PROSTATE SPECIFIC ANTIGEN (PSA): Primary | ICD-10-CM

## 2022-04-01 NOTE — TELEPHONE ENCOUNTER
Provider: DR HAJI  Caller: DINESH KIM  Relationship to Patient: SELF  Phone Number: [phone]  Reason for Call: RETURNING DR MIR  CALLREF  4K SCORE

## 2022-04-01 NOTE — TELEPHONE ENCOUNTER
I called the patient back with results of his 4K score, this indicates risk of undiagnosed intermediate risk or high risk prostate cancer at 13%.  I discussed with the patient that I consider this range to be fairly equivocal/indeterminate.  For best risk characterization and to inform our biopsy decision making, MRI prostate will give us valuable information, patient is amenable to proceeding with MRI prostate.  I discussed the goal of MRI prostate is to identify potential high risk discrete lesions which may be indicative of undiagnosed prostate cancer, understanding the location of these lesions also helps us with more accurate biopsy in the future.  I will call him with results once these are completed.  He is also complaining of some mild lower urinary tract symptoms which are worsening recently, I offered him empiric tamsulosin, he would like to defer medical treatment at this time he will reach out to me if he would like to explore this option.  We discussed the risks and side effects of the medication.    PLAN  - MRI prostate next available  - will call with results and next steps at that time    Paul Moon MD

## 2022-04-05 ENCOUNTER — TELEPHONE (OUTPATIENT)
Dept: SPORTS MEDICINE | Facility: CLINIC | Age: 57
End: 2022-04-05

## 2022-04-05 DIAGNOSIS — S43.431A SUPERIOR GLENOID LABRUM LESION OF RIGHT SHOULDER, INITIAL ENCOUNTER: Primary | ICD-10-CM

## 2022-04-05 NOTE — TELEPHONE ENCOUNTER
MRI results have been received, indexed to chart and routed to Dr. Brennan.  He will contact patient directly.

## 2022-04-05 NOTE — TELEPHONE ENCOUNTER
Patient called to set up a telehealth appointment for a MRI follow up to go over results and next steps.   He had his MRI done today, but I do not see anything available on the schedule until 4/15/22.   Is there another way he can get his results sooner and find out next steps?  Please advise, thank you!

## 2022-04-05 NOTE — TELEPHONE ENCOUNTER
I can get his results to him asap once I have the result, no need to schedule a formal telehealth appt

## 2022-04-05 NOTE — TELEPHONE ENCOUNTER
Patient notified that Dr. Brennan will be in touch as soon as we get the results. Patient verbalized understanding.  Laura can you check on the results?   Thank you!

## 2022-05-02 ENCOUNTER — OFFICE VISIT (OUTPATIENT)
Dept: ORTHOPEDIC SURGERY | Facility: CLINIC | Age: 57
End: 2022-05-02

## 2022-05-02 VITALS
DIASTOLIC BLOOD PRESSURE: 85 MMHG | SYSTOLIC BLOOD PRESSURE: 120 MMHG | WEIGHT: 180.5 LBS | BODY MASS INDEX: 24.45 KG/M2 | HEIGHT: 72 IN

## 2022-05-02 DIAGNOSIS — M25.511 RIGHT SHOULDER PAIN, UNSPECIFIED CHRONICITY: Primary | ICD-10-CM

## 2022-05-02 DIAGNOSIS — M75.81 TENDINITIS OF RIGHT ROTATOR CUFF: ICD-10-CM

## 2022-05-02 DIAGNOSIS — M19.011 OSTEOARTHRITIS OF RIGHT AC (ACROMIOCLAVICULAR) JOINT: ICD-10-CM

## 2022-05-02 DIAGNOSIS — S43.431A SUPERIOR GLENOID LABRUM LESION OF RIGHT SHOULDER, INITIAL ENCOUNTER: ICD-10-CM

## 2022-05-02 PROCEDURE — 99203 OFFICE O/P NEW LOW 30 MIN: CPT | Performed by: PHYSICIAN ASSISTANT

## 2022-05-02 NOTE — PROGRESS NOTES
[facility] Note    Subjective     Chief Complaint   Patient presents with   • Right Shoulder - Pain, Initial Evaluation        HPI  Elías Mccoy is a 56 y.o. male.  Right-hand-dominant.  New patient presents for evaluation of right shoulder pain.  Symptoms/pain have been ongoing for 5 months.  ALANA: Patient was playing basketball December 2021 when he reached and internally rotated his arm causing a twisting type action.  Since then he has had lateral shoulder pain that radiates into the anterior arm and biceps region.  Notes difficulty with overhead activities.  Decreased range of motion and strength.    Pain scale: 3/10.  Severity of the pain moderate.  Quality of the pain aching.  Associated symptoms pain only.  Activity related to pain lying on the affected side, movement of shoulder.  Pain eased by resting, PT.  No reported numbness or tingling.  Prior treatments patient is currently in PT.    Denies fever, chills, night sweats or other constitutional symptoms.      Past Medical History:   Diagnosis Date   • Depression       Past Surgical History:   Procedure Laterality Date   • KNEE SURGERY Right       Family History   Problem Relation Age of Onset   • Alcohol abuse Mother    • Heart attack Father         Acute   • Alcohol abuse Father    • Hypertension Father    • Kidney disease Father    • Cancer Other    • Stroke Other         Ischemic     Social History     Socioeconomic History   • Marital status:    Tobacco Use   • Smoking status: Never Smoker   • Smokeless tobacco: Never Used   Vaping Use   • Vaping Use: Never used   Substance and Sexual Activity   • Alcohol use: Yes     Alcohol/week: 7.0 standard drinks     Types: 7 Cans of beer per week     Comment: Social use   • Drug use: Never   • Sexual activity: Yes     Partners: Female     Birth control/protection: Post-menopausal      Current Outpatient Medications on File Prior to Visit   Medication Sig Dispense Refill   •  "apixaban (ELIQUIS) 2.5 MG tablet tablet Take 1 tablet by mouth Every 12 (Twelve) Hours. 180 tablet 3   • DULoxetine (CYMBALTA) 60 MG capsule Take 1 capsule by mouth Daily. 90 capsule 3     No current facility-administered medications on file prior to visit.      No Known Allergies     The following portions of the patient's history were reviewed and updated as appropriate: allergies, current medications, past family history, past medical history, past social history, past surgical history and problem list.    Review of Systems   Constitutional: Negative.    HENT: Negative.    Eyes: Negative.    Respiratory: Negative.    Cardiovascular: Negative.    Gastrointestinal: Negative.    Endocrine: Negative.    Genitourinary: Negative.    Musculoskeletal: Positive for arthralgias.   Skin: Negative.    Allergic/Immunologic: Negative.    Neurological: Negative.    Hematological: Negative.    Psychiatric/Behavioral: Negative.         Objective      Physical Exam  /85   Ht 182.9 cm (72.01\")   Wt 81.9 kg (180 lb 8 oz)   BMI 24.47 kg/m²     Body mass index is 24.47 kg/m².    GENERAL APPEARANCE: awake, alert & oriented x 3, in no acute distress and well developed, well nourished  PSYCH: normal mood and affect  LUNGS:  breathing nonlabored, no wheezing  EYES: sclera anicteric, pupils equal  CARDIOVASCULAR: palpable pulses. Capillary refill less than 2 seconds  INTEGUMENTARY: skin intact, no clubbing, cyanosis  NEUROLOGIC:  Normal Sensation         Ortho Exam  Right shoulder  Tenderness: Positive anterior and lateral shoulder into biceps musculature as well as pain deep inside shoulder.    Motion: Active , Abd 90, ER (elbows at side) 45.  Impingement: Neer positive.  Ricks positive.  Rotator cuff: Gary/Empty can positive. Drop arm negative. Liff-off/modified lift-off positive. Bear hug positive.  Labrum: [place]'s positive  Biceps: Speed's positive.  ACJ: Adduction cross body mild discomfort.  Strength: Rotator cuff " 4/5.  Deltoid intact  Motor: Grossly intact to Ax/MSC/R/U/M/AIN/PIN  Sensory: Grossly intact to Ax/MSC/R/U/M nerve distributions.  Vascular: 2+ radial pulse with brisk capillary refill into each digit.      Imaging/Studies  Ordered right shoulder plain films.  Imaging read/interpreted by Dr. Doe.    Imaging Results (Last 7 Days)     Procedure Component Value Units Date/Time    XR Shoulder 2+ View Right [[id number]] Resulted: 05/02/22 0941     Updated: 05/02/22 0941    Narrative:      Right Shoulder X-Ray  Indication: Pain  AP, scapular Y, and axillary lateral views    Findings:  No fracture  No bony lesion  Normal soft tissues  Normal joint spaces    No prior studies were available for comparison.          Dr. Doe and I also reviewed MRI brought in by the patient from [facility] and performed on 4/5/2022.  Impression: SLAP tear, mild rotator cuff tendinitis without tear, mild AC arthrosis.    Assessment/Plan        ICD-10-CM ICD-9-CM   1. Right shoulder pain, unspecified chronicity  M25.511 719.41   2. Superior glenoid labrum lesion of right shoulder, initial encounter  S43.431A 840.7   3. Tendinitis of right rotator cuff  M75.81 726.10   4. Osteoarthritis of right AC (acromioclavicular) joint  M19.011 715.91       Orders Placed This Encounter   Procedures   • XR Shoulder 2+ View Right        -Right shoulder pain due to SLAP tear, rotator cuff tendinitis.  Additionally patient also has AC joint osteoarthritis but asymptomatic at this time.  -Treatment options were discussed with the patient to include continue observation and PT or surgical intervention.  At this time he would like to continue with physical therapy.  -Recommend over-the-counter pain medication (Tylenol/acetaminophen) as needed.  -Follow up with Dr. Doe in 6 weeks.  If he fails to show any improvement patient states he may be interested in proceeding with surgical intervention at that time.  -Questions and concerns  answered.    Patient was also examined by Dr. Doe and he agrees with the above assessment and plan.      Medical Decision Making  Management Options : over-the-counter medicine and physical/occupational therapy  Data/Risk: radiology tests       Sonal Montiel PA-C  05/06/22  10:01 EDT               EMR Dragon/Transcription disclaimer:  Much of this encounter note is an electronic transcription of spoken language to printed text. Electronic transcription of spoken language may permit erroneous, or at times, nonsensical words or phrases to be inadvertently transcribed. Although I have reviewed the note for such errors, some may still exist.

## 2022-05-05 ENCOUNTER — APPOINTMENT (OUTPATIENT)
Dept: MRI IMAGING | Facility: HOSPITAL | Age: 57
End: 2022-05-05

## 2022-05-17 ENCOUNTER — TELEPHONE (OUTPATIENT)
Dept: UROLOGY | Facility: CLINIC | Age: 57
End: 2022-05-17

## 2022-05-17 DIAGNOSIS — R97.20 ELEVATED PROSTATE SPECIFIC ANTIGEN (PSA): Primary | ICD-10-CM

## 2022-05-17 RX ORDER — CIPROFLOXACIN 500 MG/1
500 TABLET, FILM COATED ORAL 2 TIMES DAILY
Qty: 6 TABLET | Refills: 0 | Status: SHIPPED | OUTPATIENT
Start: 2022-05-30 | End: 2022-06-02

## 2022-05-17 NOTE — TELEPHONE ENCOUNTER
Call patient back with results of MRI prostate demonstrating a PI-RADS 4 lesion at the left peripheral base.  His 4K score was 13%.  PSA trend is below.  Lab Results   Component Value Date    PSA 6.2 (H) 02/25/2022    PSA 3.010 01/24/2020    PSA 2.900 11/16/2018       At this juncture we have his bed as much information as we can obtain regarding his risk of undiagnosed prostate cancer, I recommended proceeding with prostate biopsy given PI-RADS 4 lesion.  Patient is amenable to this.  We will plan for prostate biopsy in clinic on 5-.  500 mg Cipro twice daily prior to biopsy ordered.  Enema the morning of biopsy.  GetGlue message with instructions sent.    Paul Moon MD

## 2022-05-31 ENCOUNTER — PROCEDURE VISIT (OUTPATIENT)
Dept: UROLOGY | Facility: CLINIC | Age: 57
End: 2022-05-31

## 2022-05-31 VITALS — WEIGHT: 180 LBS | BODY MASS INDEX: 24.38 KG/M2 | HEIGHT: 72 IN

## 2022-05-31 DIAGNOSIS — R97.20 ELEVATED PROSTATE SPECIFIC ANTIGEN (PSA): Primary | ICD-10-CM

## 2022-05-31 PROCEDURE — 55700 PR PROSTATE NEEDLE BIOPSY ANY APPROACH: CPT | Performed by: STUDENT IN AN ORGANIZED HEALTH CARE EDUCATION/TRAINING PROGRAM

## 2022-05-31 PROCEDURE — 81003 URINALYSIS AUTO W/O SCOPE: CPT | Performed by: STUDENT IN AN ORGANIZED HEALTH CARE EDUCATION/TRAINING PROGRAM

## 2022-05-31 PROCEDURE — 76942 ECHO GUIDE FOR BIOPSY: CPT | Performed by: STUDENT IN AN ORGANIZED HEALTH CARE EDUCATION/TRAINING PROGRAM

## 2022-05-31 NOTE — PROGRESS NOTES
Preprocedure diagnosis  Elevated PSA    Postprocedure diagnosis  Elevated PSA    Procedure  Transrectal ultrasound-guided prostate biopsy, local prostate block with 1% lidocaine injection    Attending surgeon  Paul Moon MD    Anesthesia  1% Lidocaine prostate block    Complications  None    Indications  57 y.o. malewho has a history of elevated PSA who presents today for transrectal ultrasound-guided prostate biopsy after discussion of risks, benefits, alternatives.  Informed consent was obtained.  Patient has taken his ciprofloxacin pre-procedurally and completed an enema the night before his biopsy. Here today for cognitive fusion biopsy and standard template prostate biopsy.     4k score 13%    Lab Results   Component Value Date    PSA 6.2 (H) 02/25/2022    PSA 3.010 01/24/2020    PSA 2.900 11/16/2018       MRI PROSTATE      Findings  -Digital rectal examination = Benign  -Prostate volume measurements = 58 cc volume  -PSA density = 0.10  -Total number of biopsy cores= 15    Procedure   The patient was positioned and prepped in a left lateral position with lower extremities flexed.       A digital rectal exam was performed identifying a benign feeling gland.     The rectal ultrasound probe was slowly introduced into the rectum without difficulty.  The prostate and seminal vesicles were inspected systematically using axial and sagittal views with the ultrasound.      The dimensions of the prostate were measured, for a calculated volume of 52 mL, PSA Density 0.10.      Next 5 cc of 1% lidocaine was injected at the right and left neurovascular bundles at the junction of the seminal vesicles bilaterally.  Next using a true cut biopsy needle, 15 prostate cores were collected. The specific locations were the following: left lateral base, left lateral mid, left lateral apex, left medial base, left medial mid, and left medial apex, right lateral base, right lateral mid, right lateral apex, right medial base,  right medial mid, right medial apex, and right and left transition zones. Three Additional cores were taken at the left peripheral and mid prostate.  The rectal ultrasound probe was held in place for 2 minutes for hemostasis.  The rectal ultrasound probe was removed.  MICHELLE was again performed revealing little blood in the rectum.  The patient tolerated the procedure well.     Disposition/Follow Up:     1.  The patient was instructed to drink plenty of fluids and warned about possible complications and side effects including, but not limited to, blood in the urine, stool and semen as well as bloodstream infection.  He was instructed to call the office if there are any issues, especially fevers or flu-like symptoms.    2.  Continue antibiotic for a total of 3 days.  3.  Call the patient to discuss pathology results and next steps    Paul Moon MD  [facility]

## 2022-06-02 LAB — REF LAB TEST METHOD: NORMAL

## 2022-06-06 ENCOUNTER — TELEPHONE (OUTPATIENT)
Dept: UROLOGY | Facility: CLINIC | Age: 57
End: 2022-06-06

## 2022-06-06 DIAGNOSIS — R97.20 ELEVATED PROSTATE SPECIFIC ANTIGEN (PSA): Primary | ICD-10-CM

## 2022-06-06 NOTE — TELEPHONE ENCOUNTER
Call patient with results of prostate biopsy, 15 core biopsy negative for malignancy.  Despite his MRI which demonstrates a possible PI-RADS 4 lesion on the left peripheral base.  Patient happy to hear this news.  We will see him back in 6 months with repeat PSA.  If his PSA continues to rise options at that time would include saturation prostate biopsy in the operating room under anesthesia.  At this time with a good sampling of his prostate with recent biopsy, hopefully this is unnecessary.    PLAN  - RTC 6 months with repeat PSA prior  - call patient to arrange    Paul Moon MD

## 2022-11-17 ENCOUNTER — TELEPHONE (OUTPATIENT)
Dept: UROLOGY | Facility: CLINIC | Age: 57
End: 2022-11-17

## 2022-11-17 NOTE — TELEPHONE ENCOUNTER
Patient asking if he needs to fast before the lab tests, PSA and 4K.  Informed pt he does not and he can have them done at any [facility].

## 2022-11-18 ENCOUNTER — LAB (OUTPATIENT)
Dept: LAB | Facility: HOSPITAL | Age: 57
End: 2022-11-18

## 2022-11-18 DIAGNOSIS — R97.20 ELEVATED PROSTATE SPECIFIC ANTIGEN (PSA): ICD-10-CM

## 2022-11-18 LAB — PSA SERPL-MCNC: 5.89 NG/ML (ref 0–4)

## 2022-11-18 PROCEDURE — 36415 COLL VENOUS BLD VENIPUNCTURE: CPT

## 2022-11-18 PROCEDURE — 84153 ASSAY OF PSA TOTAL: CPT

## 2022-11-22 ENCOUNTER — OFFICE VISIT (OUTPATIENT)
Dept: UROLOGY | Facility: CLINIC | Age: 57
End: 2022-11-22

## 2022-11-22 VITALS — BODY MASS INDEX: 25.06 KG/M2 | WEIGHT: 185 LBS | HEIGHT: 72 IN

## 2022-11-22 DIAGNOSIS — R97.20 ELEVATED PROSTATE SPECIFIC ANTIGEN (PSA): Primary | ICD-10-CM

## 2022-11-22 PROCEDURE — 99214 OFFICE O/P EST MOD 30 MIN: CPT | Performed by: STUDENT IN AN ORGANIZED HEALTH CARE EDUCATION/TRAINING PROGRAM

## 2022-11-22 NOTE — PROGRESS NOTES
Follow Up Office Visit      Patient Name: Elías Mccoy  : 1965   MRN: [id number]     Chief Complaint:    Chief Complaint   Patient presents with   • Elevated PSA       Referring Provider: No ref. provider found    History of Present Illness: Elías Mccoy is a 57 y.o. male who presents today for follow up of elevated PSA.  Patient originally saw me in March.  Underwent 4K score for characterization of risk and this came back at 13, mildly elevated risk.  For complete evaluation he completed MRI prostate demonstrating a PI-RADS 4 lesion of the left peripheral base.  The patient was taken for prostate biopsy 2022, 15 core biopsy.  Prostate biopsy came back negative for malignancy.  He returns today for 6-month follow-up with repeat PSA which is stable 5.9.    Lab Results   Component Value Date    PSA 5.890 (H) 2022    PSA 6.2 (H) 2022    PSA 3.010 2020    PSA 2.900 2018     TRUS prostate sizing 58 cc, his PSA density 0.10.  Less concerning for malignancy.    Denies any recent urologic issues.     Subjective      Review of System: Review of Systems   Constitutional: Negative for chills, fatigue, fever and unexpected weight change.   HENT: Negative for sore throat.    Eyes: Negative for visual disturbance.   Respiratory: Negative for cough, chest tightness and shortness of breath.    Cardiovascular: Negative for chest pain and leg swelling.   Gastrointestinal: Negative for blood in stool, constipation, diarrhea, nausea, rectal pain and vomiting.   Genitourinary: Negative for decreased urine volume, difficulty urinating, dysuria, enuresis, flank pain, frequency, genital sores, hematuria and urgency.   Musculoskeletal: Negative for back pain and joint swelling.   Skin: Negative for rash and wound.   Neurological: Negative for seizures, speech difficulty, weakness and headaches.   Psychiatric/Behavioral: Negative for confusion, sleep disturbance and suicidal ideas. The patient is  "not nervous/anxious.       I have reviewed the ROS documented by my clinical staff, I have updated appropriately and I agree. Paul Moon MD    I have reviewed and the following portions of the patient's history were updated as appropriate: past family history, past medical history, past social history, past surgical history and problem list.    Medications:     Current Outpatient Medications:   •  apixaban (ELIQUIS) 2.5 MG tablet tablet, Take 1 tablet by mouth Every 12 (Twelve) Hours., Disp: 180 tablet, Rfl: 3  •  DULoxetine (CYMBALTA) 60 MG capsule, Take 1 capsule by mouth Daily., Disp: 90 capsule, Rfl: 3    Allergies:   No Known Allergies       Objective     Physical Exam:   Vital Signs:   Vitals:    11/22/22 0820   Weight: 83.9 kg (185 lb)   Height: 182.9 cm (72.01\")     Body mass index is 25.08 kg/m².     Physical Exam  Constitutional:       Appearance: Normal appearance.   HENT:      Head: Normocephalic and atraumatic.      Nose: Nose normal.   Eyes:      Extraocular Movements: Extraocular movements intact.      Conjunctiva/sclera: Conjunctivae normal.      Pupils: Pupils are equal, round, and reactive to light.   Musculoskeletal:         General: Normal range of motion.      Cervical back: Normal range of motion and neck supple.   Skin:     General: Skin is warm and dry.      Findings: No lesion or rash.   Neurological:      General: No focal deficit present.      Mental Status: He is alert and oriented to person, place, and time. Mental status is at baseline.   Psychiatric:         Mood and Affect: Mood normal.         Behavior: Behavior normal.         Labs:   Brief Urine Lab Results  (Last result in the past 365 days)      Color   Clarity   Blood   Leuk Est   Nitrite   Protein   CREAT   Urine HCG        05/31/22 1549 Yellow   Clear   Negative   Negative   Negative   Negative                      Lab Results   Component Value Date    GLUCOSE 89 02/25/2022    CALCIUM 9.1 02/25/2022     " 02/25/2022    K 4.3 02/25/2022    CO2 25 02/25/2022     02/25/2022    BUN 17 02/25/2022    CREATININE 1.10 02/25/2022    EGFRIFAFRI 87 02/25/2022    EGFRIFNONA 75 02/25/2022    BCR 15 02/25/2022       Lab Results   Component Value Date    WBC 5.5 02/25/2022    HGB 14.9 02/25/2022    HCT 43.3 02/25/2022    MCV 87 02/25/2022     02/25/2022       Images:   No Images in the past 120 days found..    Measures:   Tobacco:   Elías Mccoy  reports that he has never smoked. He has never used smokeless tobacco.        Assessment / Plan      Assessment/Plan:   57 y.o. male who presented today for follow up of elevated PSA.  Patient has a history of mildly elevated 4K score at 14%, MRI prostate demonstrated a 1.4 cm high suspicion PI-RADS 4 lesion along the left peripheral base.  Despite 15 core biopsy, all specimen came back negative for malignancy.  His PSA has remained stable, albeit remains elevated.  Discussed options including continued PSA screening or send confirm MDX, DNA methylation report on his previous pathologic specimens to determine risk of finding prostate cancer on subsequent biopsy.  Patient is interested in this approach.  I will call him with result and we will see him in 6 months with PSA and MICHELLE.    Diagnoses and all orders for this visit:    1. Elevated prostate specific antigen (PSA) (Primary)  - f/u 6 months with repeat PSA and MICHELLE  - Confirm MDx DNA methylation test on his prior prostate pathology to more accurately determine risk of missed malignancy, will call with results          Follow Up:   No follow-ups on file.    I spent approximately 30 minutes providing clinical care for this patient; including review of patient's chart and provider documentation, face to face time spent with patient in examination room (obtaining history, performing physical exam, discussing diagnosis and management options), placing orders, and completing patient documentation.     Paul Moon,  MD  [facility]

## 2022-12-01 ENCOUNTER — TELEPHONE (OUTPATIENT)
Dept: UROLOGY | Facility: CLINIC | Age: 57
End: 2022-12-01

## 2022-12-01 NOTE — TELEPHONE ENCOUNTER
Confirm MDX genetic screening of his previous prostate biopsy specimen to determine risk of finding subsequent cancer on a repeat biopsy came back very low risk (DNA methylation screening was negative).     The patient will continue to see me every 6 months with repeat PSA, we will continue to trend the PSA to ensure stability.  He has a previously negative prostate biopsy.  Patient reports understanding. See me in May as previously scheduled with PSA prior.       Paul Moon MD   [facility]

## 2023-01-10 ENCOUNTER — TELEPHONE (OUTPATIENT)
Dept: UROLOGY | Facility: CLINIC | Age: 58
End: 2023-01-10
Payer: COMMERCIAL

## 2023-01-10 NOTE — TELEPHONE ENCOUNTER
I called patient in regards to upcoming appt. We are no longer accepting taylor but pt wanted to keep appt. I explained to him he would need a continuity of care form from taylor and would bring it on his appt date. He voiced understanding

## 2023-03-31 DIAGNOSIS — F32.A DEPRESSION, UNSPECIFIED DEPRESSION TYPE: ICD-10-CM

## 2023-04-03 RX ORDER — DULOXETIN HYDROCHLORIDE 60 MG/1
CAPSULE, DELAYED RELEASE ORAL
Qty: 30 CAPSULE | Refills: 0 | Status: SHIPPED | OUTPATIENT
Start: 2023-04-03

## 2023-04-30 DIAGNOSIS — F32.A DEPRESSION, UNSPECIFIED DEPRESSION TYPE: ICD-10-CM

## 2023-05-01 RX ORDER — DULOXETIN HYDROCHLORIDE 60 MG/1
CAPSULE, DELAYED RELEASE ORAL
Qty: 30 CAPSULE | Refills: 0 | OUTPATIENT
Start: 2023-05-01

## 2023-05-22 ENCOUNTER — LAB (OUTPATIENT)
Dept: LAB | Facility: HOSPITAL | Age: 58
End: 2023-05-22
Payer: COMMERCIAL

## 2023-05-22 DIAGNOSIS — R97.20 ELEVATED PROSTATE SPECIFIC ANTIGEN (PSA): ICD-10-CM

## 2023-05-22 PROCEDURE — 84153 ASSAY OF PSA TOTAL: CPT

## 2023-05-22 PROCEDURE — 36415 COLL VENOUS BLD VENIPUNCTURE: CPT

## 2023-05-23 LAB — PSA SERPL-MCNC: 6.35 NG/ML (ref 0–4)

## 2023-05-24 ENCOUNTER — OFFICE VISIT (OUTPATIENT)
Dept: UROLOGY | Facility: CLINIC | Age: 58
End: 2023-05-24
Payer: COMMERCIAL

## 2023-05-24 VITALS — BODY MASS INDEX: 25.27 KG/M2 | WEIGHT: 186.6 LBS | HEART RATE: 81 BPM | HEIGHT: 72 IN | OXYGEN SATURATION: 96 %

## 2023-05-24 DIAGNOSIS — R97.20 ELEVATED PROSTATE SPECIFIC ANTIGEN (PSA): ICD-10-CM

## 2023-05-24 PROCEDURE — 81003 URINALYSIS AUTO W/O SCOPE: CPT | Performed by: STUDENT IN AN ORGANIZED HEALTH CARE EDUCATION/TRAINING PROGRAM

## 2023-05-24 PROCEDURE — 51798 US URINE CAPACITY MEASURE: CPT | Performed by: STUDENT IN AN ORGANIZED HEALTH CARE EDUCATION/TRAINING PROGRAM

## 2023-05-24 PROCEDURE — 99213 OFFICE O/P EST LOW 20 MIN: CPT | Performed by: STUDENT IN AN ORGANIZED HEALTH CARE EDUCATION/TRAINING PROGRAM

## 2023-05-24 NOTE — PROGRESS NOTES
Follow Up Office Visit      Patient Name: Elías Mccoy  : 1965   MRN: [id number]     Chief Complaint:    Chief Complaint   Patient presents with   • Elevated PSA       Referring Provider: No ref. provider found    History of Present Illness: Elías Mccoy is a 58 y.o. male who presents today for follow up of elevated PSA.  Saw me 2022.  PSA at that time was 6.2, 4K score was 13%.  MRI prostate demonstrated PI-RADS 4 lesion of the left peripheral base.  Underwent a prostate biopsy on 2022, 15 core biopsy resulted negative for any malignancy.  Prostate volume on MRI is 58 cc.  He returns 1 year after initial biopsy and PSA is stable at 6.35.    Confirm MDX genetic screening of previous prostate biopsy specimen was sent and came back very low risk, DNA methylation screen was negative.    Lab Results   Component Value Date    PSA 6.350 (H) 2023    PSA 5.890 (H) 2022    PSA 6.2 (H) 2022    PSA 3.010 2020    PSA 2.900 2018         Subjective      Review of System: Review of Systems   Genitourinary: Positive for urgency.      I have reviewed the ROS documented by my clinical staff, I have updated appropriately and I agree. Paul Moon MD    I have reviewed and the following portions of the patient's history were updated as appropriate: past family history, past medical history, past social history, past surgical history and problem list.    Medications:     Current Outpatient Medications:   •  apixaban (ELIQUIS) 2.5 MG tablet tablet, Take 1 tablet by mouth Every 12 (Twelve) Hours., Disp: 180 tablet, Rfl: 3  •  DULoxetine (CYMBALTA) 60 MG capsule, TAKE 1 CAPSULE BY MOUTH EVERY DAY, Disp: 30 capsule, Rfl: 0    Allergies:   No Known Allergies    IPSS Questionnaire (AUA-7):  Over the past month…    1)  Incomplete Emptying:       How often have you had a sensation of not emptying you had the sensation of not emptying your bladder completely after you finished  urinating?  1 - Less than 1 time in 5   2)  Frequency:       How often have you had the urinate again less than two hours after you finished urinating?  0 - Not at all   3)  Intermittency:       How often have you found you stopped and started again several times when you urinated?   0 - Not at all   4) Urgency:      How often have you found it difficult to postpone urination?  0 - Not at all   5) Weak Stream:      How often have you had a weak urinary stream?  2 - Less than half the time   6) Straining:       How often have you had to push or strain to begin urination?  0 - Not at all   7) Nocturia:      How many times did you most typically get up to urinate from the time you went to bed at night until the time you got up in the morning?  1 - One Time -    Total Score: 3   The International Prostate Symptom Score (IPSS) is used to screen, diagnose, track symptoms of benign prostatic hyperplasia (BPH).   0-7 (Mild Symptoms) 8-19 (Moderate) 20-35 (Severe)   Quality of Life (QoL):  If you were to spend the rest of your life with your urinary condition just the way it is now, how would you feel about that? 0-Delighted   Urine Leakage (Incontinence) 0-No Leakage     Sexual Health Inventory for Men (SAFIA)   Over the past 6 months:     1. How do you rate your confidence that you could get and keep an erection?  4 - High    2. When you had erections with sexual  stimulation, how often were your erections hard enough for penetration (entering your partner)?  4 - Most times ( much more than, half the time)   3. During sexual intercourse, how often were you able to maintain your erection after you had penetrated (entered) your partner?  4 - Most times ( much more than, half the time)   4. During sexual intercourse, how difficult was it to maintain your erection to completion of intercourse?  4 - Sightly difficult    5. When you attempted sexual intercourse, how often was it satisfactory for you?  5 - Almost always or always   "   Total Score: 21   The Sexual Health Inventory for Men further classifies ED severity with the following breakpoints:   1-7 (Severe ED) 8-11 (Moderate ED) 12-16 (Mild to Moderate ED) 17-21 (Mild ED)      Post void residual bladder scan:   20 mL     Objective     Physical Exam:   Vital Signs:   Vitals:    05/24/23 0747   Pulse: 81   SpO2: 96%   Weight: 84.6 kg (186 lb 9.6 oz)   Height: 182.9 cm (72.01\")     Body mass index is 25.3 kg/m².     Physical Exam  Constitutional:       Appearance: Normal appearance.   HENT:      Head: Normocephalic and atraumatic.      Nose: Nose normal.   Eyes:      Extraocular Movements: Extraocular movements intact.      Conjunctiva/sclera: Conjunctivae normal.      Pupils: Pupils are equal, round, and reactive to light.   Genitourinary:     Comments: Digital rectal examination demonstrates normal tone, no blood, 55+ gram prostate with no nodules or tenderness, smooth  Musculoskeletal:         General: Normal range of motion.      Cervical back: Normal range of motion and neck supple.   Skin:     General: Skin is warm and dry.      Findings: No lesion or rash.   Neurological:      General: No focal deficit present.      Mental Status: He is alert and oriented to person, place, and time. Mental status is at baseline.   Psychiatric:         Mood and Affect: Mood normal.         Behavior: Behavior normal.         Labs:   Brief Urine Lab Results  (Last result in the past 365 days)      Color   Clarity   Blood   Leuk Est   Nitrite   Protein   CREAT   Urine HCG        05/24/23 0759 Yellow   Clear   Negative   Negative   Negative   Negative                      Lab Results   Component Value Date    GLUCOSE 89 02/25/2022    CALCIUM 9.1 02/25/2022     02/25/2022    K 4.3 02/25/2022    CO2 25 02/25/2022     02/25/2022    BUN 17 02/25/2022    CREATININE 1.10 02/25/2022    EGFRIFAFRI 87 02/25/2022    EGFRIFNONA 75 02/25/2022    BCR 15 02/25/2022       Lab Results   Component Value Date "    WBC 5.5 02/25/2022    HGB 14.9 02/25/2022    HCT 43.3 02/25/2022    MCV 87 02/25/2022     02/25/2022       Images:   No Images in the past 120 days found..    Measures:   Tobacco:   Elías Mccoy  reports that he has never smoked. He has never used smokeless tobacco.       Assessment / Plan      Assessment/Plan:   58 y.o. male who presented today for follow up of elevated PSA, negative previous prostate biopsy, MRI demonstrating PI-RADS 4 lesion at the left peripheral base, PSA is relatively stable at 6.3.,  PSA density is less concerning at 0.108.  Options with the patient discussed include repeating a prostate biopsy, continuing to trend his PSA or repeating an MRI at some point.  I think it would be reasonable to repeat his PSA again in 6 months.  If PSA remains stable we will back at his PSA checks to every year given his previously negative work-up.  If rising PSA I would recommend repeating an MRI prostate given the confusing PI-RADS 4 lesion on his left peripheral base which resulted negative for malignancy.  Confirm MDX genetic screening demonstrates no obvious risk of methylation, this means that the risk of missing obvious prostate cancer around the biopsy specimens is very low.    Follow-up in 6 months with a repeat PSA prior    Diagnoses and all orders for this visit:    1. Elevated prostate specific antigen (PSA)  -     POC Urinalysis Dipstick, Automated           Follow Up:   Return in about 6 months (around 11/24/2023).    I spent approximately 20 minutes providing clinical care for this patient; including review of patient's chart and provider documentation, face to face time spent with patient in examination room (obtaining history, performing physical exam, discussing diagnosis and management options), placing orders, and completing patient documentation.     Paul Moon MD  [facility] [place]

## 2023-11-27 ENCOUNTER — LAB (OUTPATIENT)
Dept: LAB | Facility: HOSPITAL | Age: 58
End: 2023-11-27
Payer: MEDICAID

## 2023-11-27 ENCOUNTER — OFFICE VISIT (OUTPATIENT)
Dept: UROLOGY | Facility: CLINIC | Age: 58
End: 2023-11-27
Payer: MEDICAID

## 2023-11-27 VITALS — BODY MASS INDEX: 25.06 KG/M2 | OXYGEN SATURATION: 98 % | WEIGHT: 185 LBS | HEIGHT: 72 IN | HEART RATE: 61 BPM

## 2023-11-27 DIAGNOSIS — R97.20 ELEVATED PROSTATE SPECIFIC ANTIGEN (PSA): ICD-10-CM

## 2023-11-27 DIAGNOSIS — R97.20 ELEVATED PROSTATE SPECIFIC ANTIGEN (PSA): Primary | ICD-10-CM

## 2023-11-27 PROCEDURE — 84153 ASSAY OF PSA TOTAL: CPT

## 2023-11-27 PROCEDURE — 36415 COLL VENOUS BLD VENIPUNCTURE: CPT

## 2023-11-27 PROCEDURE — 99213 OFFICE O/P EST LOW 20 MIN: CPT | Performed by: STUDENT IN AN ORGANIZED HEALTH CARE EDUCATION/TRAINING PROGRAM

## 2023-11-27 PROCEDURE — 84154 ASSAY OF PSA FREE: CPT

## 2023-11-27 RX ORDER — ZOLPIDEM TARTRATE 5 MG/1
5 TABLET ORAL NIGHTLY PRN
COMMUNITY
Start: 2023-11-20

## 2023-11-27 NOTE — PROGRESS NOTES
Follow Up Office Visit      Patient Name: Elías Mccoy  : 1965   MRN: [id number]     Chief Complaint:    Chief Complaint   Patient presents with    Elevated PSA       Referring Provider: No ref. provider found    History of Present Illness: Elías Mccoy is a 58 y.o. male who presents today for follow up of elevated PSA.  Patient's PSA has been variable over the last year and a half.  He underwent a 15 core biopsy May 2022 negative for malignancy.  PI-RADS 4 lesion was noted on MRI prostate prior to this in the left peripheral base.    Lab Results   Component Value Date    PSA 6.350 (H) 2023    PSA 5.890 (H) 2022    PSA 6.2 (H) 2022    PSA 3.010 2020    PSA 2.900 2018     Patient did not complete his 6-month prior to today's visit.    He has mild to moderate lower urinary tract symptoms.  He is not on alpha-blocker.  Reports nocturia 1-2 times a night.  Mild urgency.    Subjective      Review of System: Review of Systems   Genitourinary:  Positive for urgency.      I have reviewed the ROS documented by my clinical staff, I have updated appropriately and I agree. Paul Moon MD    I have reviewed and the following portions of the patient's history were updated as appropriate: past family history, past medical history, past social history, past surgical history and problem list.    Medications:     Current Outpatient Medications:     apixaban (ELIQUIS) 2.5 MG tablet tablet, Take 1 tablet by mouth Every 12 (Twelve) Hours., Disp: 180 tablet, Rfl: 3    DULoxetine (CYMBALTA) 60 MG capsule, TAKE 1 CAPSULE BY MOUTH EVERY DAY, Disp: 30 capsule, Rfl: 0    zolpidem (AMBIEN) 5 MG tablet, Take 1 tablet by mouth At Night As Needed for Sleep., Disp: , Rfl:     Allergies:   No Known Allergies    IPSS Questionnaire (AUA-7):  Over the past month…    1)  Incomplete Emptying:       How often have you had a sensation of not emptying you had the sensation of not emptying your bladder  completely after you finished urinating?  1 - Less than 1 time in 5   2)  Frequency:       How often have you had the urinate again less than two hours after you finished urinating?  1 - Less than 1 time in 5   3)  Intermittency:       How often have you found you stopped and started again several times when you urinated?   2 - Less than half the time   4) Urgency:      How often have you found it difficult to postpone urination?  3 - About half the time   5) Weak Stream:      How often have you had a weak urinary stream?  1 - Less than 1 time in 5   6) Straining:       How often have you had to push or strain to begin urination?  1 - Less than 1 time in 5   7) Nocturia:      How many times did you most typically get up to urinate from the time you went to bed at night until the time you got up in the morning?  2 - 2 times   Total Score:  9   The International Prostate Symptom Score (IPSS) is used to screen, diagnose, track symptoms of benign prostatic hyperplasia (BPH).   0-7 (Mild Symptoms) 8-19 (Moderate) 20-35 (Severe)   Quality of Life (QoL):  If you were to spend the rest of your life with your urinary condition just the way it is now, how would you feel about that? 1-Pleased   Urine Leakage (Incontinence) 0-No Leakage     Sexual Health Inventory for Men (SAFIA)   Over the past 6 months:     1. How do you rate your confidence that you could get and keep an erection?  4 - High    2. When you had erections with sexual  stimulation, how often were your erections hard enough for penetration (entering your partner)?  4 - Most times ( much more than, half the time)   3. During sexual intercourse, how often were you able to maintain your erection after you had penetrated (entered) your partner?  3 - Sometimes (About half the time)    4. During sexual intercourse, how difficult was it to maintain your erection to completion of intercourse?  4 - Sightly difficult    5. When you attempted sexual intercourse, how often  "was it satisfactory for you?  4 - Most times ( much more than, half the time)    Total Score: 19   The Sexual Health Inventory for Men further classifies ED severity with the following breakpoints:   1-7 (Severe ED) 8-11 (Moderate ED) 12-16 (Mild to Moderate ED) 17-21 (Mild ED)        Objective     Physical Exam:   Vital Signs:   Vitals:    11/27/23 0935   Pulse: 61   SpO2: 98%   Weight: 83.9 kg (185 lb)   Height: 182.9 cm (72.01\")   PainSc: 0-No pain     Body mass index is 25.08 kg/m².     Physical Exam  Constitutional:       Appearance: Normal appearance.   HENT:      Head: Normocephalic and atraumatic.      Nose: Nose normal.   Eyes:      Extraocular Movements: Extraocular movements intact.      Conjunctiva/sclera: Conjunctivae normal.      Pupils: Pupils are equal, round, and reactive to light.   Musculoskeletal:         General: Normal range of motion.      Cervical back: Normal range of motion and neck supple.   Skin:     General: Skin is warm and dry.      Findings: No lesion or rash.   Neurological:      General: No focal deficit present.      Mental Status: He is alert and oriented to person, place, and time. Mental status is at baseline.   Psychiatric:         Mood and Affect: Mood normal.         Behavior: Behavior normal.         Labs:   Brief Urine Lab Results  (Last result in the past 365 days)        Color   Clarity   Blood   Leuk Est   Nitrite   Protein   CREAT   Urine HCG        05/24/23 0759 Yellow   Clear   Negative   Negative   Negative   Negative                        Lab Results   Component Value Date    GLUCOSE 89 02/25/2022    CALCIUM 9.1 02/25/2022     02/25/2022    K 4.3 02/25/2022    CO2 25 02/25/2022     02/25/2022    BUN 17 02/25/2022    CREATININE 1.10 02/25/2022    EGFRIFAFRI 87 02/25/2022    EGFRIFNONA 75 02/25/2022    BCR 15 02/25/2022       Lab Results   Component Value Date    WBC 5.5 02/25/2022    HGB 14.9 02/25/2022    HCT 43.3 02/25/2022    MCV 87 02/25/2022    PLT " 217 02/25/2022       Images:   No Images in the past 120 days found..    Measures:   Tobacco:   Elías Mccoy  reports that he has never smoked. He has never used smokeless tobacco.      Assessment / Plan      Assessment/Plan:   58 y.o. male who presented today for follow up of elevated PSA and mild lower urinary tract symptoms.  He has a previously negative prostate biopsy.  He had a PI-RADS 4 lesion noted on a remote prostate MRI.  I have asked him to repeat his PSA soon and I will message him with results.  If persistently rising PSA I will recommend a repeat MRI prostate with plan for possible fusion biopsy in the OR if necessary to ensure no missed malignancy.  Confirm MDX testing on his pathology specimen also came back at very low risk.    Diagnoses and all orders for this visit:    1. Elevated prostate specific antigen (PSA) (Primary)  -     PSA Total+% Free (Serial); Future           Follow Up:   Return in about 1 year (around 11/27/2024).    I spent approximately 20 minutes providing clinical care for this patient; including review of patient's chart and provider documentation, face to face time spent with patient in examination room (obtaining history, performing physical exam, discussing diagnosis and management options), placing orders, and completing patient documentation.     Paul Moon MD  [facility] [place]

## 2023-11-28 LAB
PSA FREE MFR SERPL: 15.4 %
PSA FREE SERPL-MCNC: 0.86 NG/ML
PSA SERPL-MCNC: 5.6 NG/ML (ref 0–4)

## 2024-05-16 ENCOUNTER — TELEPHONE (OUTPATIENT)
Dept: UROLOGY | Facility: CLINIC | Age: 59
End: 2024-05-16

## 2024-05-16 DIAGNOSIS — N40.1 BPH WITH OBSTRUCTION/LOWER URINARY TRACT SYMPTOMS: Primary | ICD-10-CM

## 2024-05-16 DIAGNOSIS — N13.8 BPH WITH OBSTRUCTION/LOWER URINARY TRACT SYMPTOMS: Primary | ICD-10-CM

## 2024-05-16 RX ORDER — TAMSULOSIN HYDROCHLORIDE 0.4 MG/1
1 CAPSULE ORAL DAILY
Qty: 90 CAPSULE | Refills: 3 | Status: SHIPPED | OUTPATIENT
Start: 2024-05-16

## 2024-05-16 NOTE — TELEPHONE ENCOUNTER
DELETE AFTER REVIEWING: Send this encounter to the appropriate pool. See your Call Action Grid or Workflows for direction.    Caller: Elías Mccoy    Relationship: Self    Best call back number: [phone]    What medication are you requesting: FLOMAX    What are your current symptoms: FREQUENT URINATION    How long have you been experiencing symptoms: THIS WAS DISCUSSED AT LAST APPT. PT DIDN'T NEED AT THAT TIME BUT PT NEEDS NOW.    Have you had these symptoms before:    [x] Yes  [] No    Have you been treated for these symptoms before:   [x] Yes  [] No    If a prescription is needed, what is your preferred pharmacy and phone number:  [facility]

## 2024-05-16 NOTE — TELEPHONE ENCOUNTER
Please advise, medication is not in his active meds.  If sent he would like it to go to [facility].        [id number] - [place] - [address] - [phone]  - [phone] FX

## 2024-11-25 ENCOUNTER — OFFICE VISIT (OUTPATIENT)
Age: 59
End: 2024-11-25
Payer: MEDICAID

## 2024-11-25 VITALS
OXYGEN SATURATION: 96 % | WEIGHT: 185 LBS | SYSTOLIC BLOOD PRESSURE: 114 MMHG | HEIGHT: 72 IN | BODY MASS INDEX: 25.06 KG/M2 | HEART RATE: 74 BPM | DIASTOLIC BLOOD PRESSURE: 72 MMHG

## 2024-11-25 DIAGNOSIS — N40.1 BPH WITH OBSTRUCTION/LOWER URINARY TRACT SYMPTOMS: ICD-10-CM

## 2024-11-25 DIAGNOSIS — N13.8 BPH WITH OBSTRUCTION/LOWER URINARY TRACT SYMPTOMS: ICD-10-CM

## 2024-11-25 DIAGNOSIS — R97.20 ELEVATED PROSTATE SPECIFIC ANTIGEN (PSA): Primary | ICD-10-CM

## 2024-11-25 RX ORDER — DICLOFENAC SODIUM AND MISOPROSTOL 50; 200 MG/1; UG/1
1 TABLET, DELAYED RELEASE ORAL DAILY
COMMUNITY

## 2024-11-25 NOTE — PROGRESS NOTES
Follow Up Office Visit      Patient Name: Elías Mccoy  : 1965   MRN: [id number]     Chief Complaint:    Chief Complaint   Patient presents with    Elevated PSA       Referring Provider: Leonardo Tsang MD    History of Present Illness: Elías Mccoy is a 59 y.o. male who presents today for follow up of  elevated PSA.  Last seen 2023.  PSA has been variably elevated.  MRI prostate performed in  demonstrated a PI-RADS 4 lesion of the left peripheral base.  Prostate volume was 58 g.    Psa was 6.0 in . Underwent a 15 core biopsy negative for malignancy. Confirm Mdx demonstrated low risk of missed cancer. PSA has been rising somewhat.     He apparently started on Flomax by PCP for LUTS which includes mildly reduced stream, intermittency, urgency frequency and nocturia 2X nightly.  IPSS score is 16 with a mostly satisfied quality of life.  He has seen some benefit from his Flomax    PSA now 6.7.  In  his PSA was 6.2 so this is relatively stable but still has risen somewhat.          Subjective      Review of System: Review of Systems   Genitourinary:  Positive for urgency.      I have reviewed the ROS documented by my clinical staff, I have updated appropriately and I agree. Paul Moon MD    I have reviewed and the following portions of the patient's history were updated as appropriate: past family history, past medical history, past social history, past surgical history and problem list.    Medications:     Current Outpatient Medications:     apixaban (ELIQUIS) 2.5 MG tablet tablet, Take 1 tablet by mouth Every 12 (Twelve) Hours., Disp: 180 tablet, Rfl: 3    diclofenac-miSOPROStol (ARTHROTEC 50) 50-0.2 MG EC tablet, Take 1 tablet by mouth Daily., Disp: , Rfl:     DULoxetine (CYMBALTA) 60 MG capsule, TAKE 1 CAPSULE BY MOUTH EVERY DAY, Disp: 30 capsule, Rfl: 0    tamsulosin (FLOMAX) 0.4 MG capsule 24 hr capsule, Take 1 capsule by mouth Daily., Disp: 90 capsule, Rfl: 3     "zolpidem (AMBIEN) 5 MG tablet, Take 1 tablet by mouth At Night As Needed for Sleep., Disp: , Rfl:     Allergies:   No Known Allergies    IPSS Questionnaire (AUA-7):  Over the past month…    1)  Incomplete Emptying:       How often have you had a sensation of not emptying you had the sensation of not emptying your bladder completely after you finished urinating?  1 - Less than 1 time in 5   2)  Frequency:       How often have you had the urinate again less than two hours after you finished urinating?  1 - Less than 1 time in 5   3)  Intermittency:       How often have you found you stopped and started again several times when you urinated?   4 - More than half the time   4) Urgency:      How often have you found it difficult to postpone urination?  3 - About half the time   5) Weak Stream:      How often have you had a weak urinary stream?  2 - Less than half the time   6) Straining:       How often have you had to push or strain to begin urination?  3 - About half the time   7) Nocturia:      How many times did you most typically get up to urinate from the time you went to bed at night until the time you got up in the morning?  2 - 2 times   Total Score:  16   The International Prostate Symptom Score (IPSS) is used to screen, diagnose, track symptoms of benign prostatic hyperplasia (BPH).   0-7 (Mild Symptoms) 8-19 (Moderate) 20-35 (Severe)   Quality of Life (QoL):  If you were to spend the rest of your life with your urinary condition just the way it is now, how would you feel about that? 2-Mostly Satisfied   Urine Leakage (Incontinence) 0-No Leakage         Objective     Physical Exam:   Vital Signs:   Vitals:    11/25/24 0943   BP: 114/72   Pulse: 74   SpO2: 96%   Weight: 83.9 kg (185 lb)   Height: 182.9 cm (72.01\")     Body mass index is 25.08 kg/m².     Physical Exam  Vitals and nursing note reviewed.   Constitutional:       Appearance: Normal appearance.   HENT:      Head: Normocephalic and atraumatic.      " Mouth/Throat:      Mouth: Mucous membranes are moist.      Pharynx: Oropharynx is clear.   Eyes:      Extraocular Movements: Extraocular movements intact.      Conjunctiva/sclera: Conjunctivae normal.   Cardiovascular:      Rate and Rhythm: Normal rate and regular rhythm.   Pulmonary:      Effort: Pulmonary effort is normal. No respiratory distress.   Abdominal:      Palpations: Abdomen is soft.      Tenderness: There is no abdominal tenderness. There is no right CVA tenderness or left CVA tenderness.   Genitourinary:     Comments:  Circumcised phallus, orthotopic meatus, bilaterally descended testicles without masses, or lesions.     MICHELLE: Normal rectal tone, no blood, estimated 50 gram prostate without nodularity or firmness.   Musculoskeletal:         General: Normal range of motion.      Cervical back: Normal range of motion.   Skin:     General: Skin is warm and dry.   Neurological:      General: No focal deficit present.      Mental Status: He is alert and oriented to person, place, and time.   Psychiatric:         Mood and Affect: Mood normal.         Behavior: Behavior normal.         Labs:   Brief Urine Lab Results       None                 Lab Results   Component Value Date    GLUCOSE 89 02/25/2022    CALCIUM 9.1 02/25/2022     02/25/2022    K 4.3 02/25/2022    CO2 25 02/25/2022     02/25/2022    BUN 17 02/25/2022    CREATININE 1.10 02/25/2022    EGFRIFAFRI 87 02/25/2022    EGFRIFNONA 75 02/25/2022    BCR 15 02/25/2022       Lab Results   Component Value Date    WBC 6.64 04/01/2024    HGB 15.2 04/01/2024    HCT 46.5 04/01/2024    MCV 92 04/01/2024     04/01/2024       Images:   No Images in the past 120 days found..    Measures:   Tobacco:   Elías Mccoy  reports that he has never smoked. He has never used smokeless tobacco.      Assessment / Plan      Assessment/Plan:   59 y.o. male who presented today for follow up of elevated PSA.  Negative prostate biopsy in 2022, however PSA is  rising.  I recommended repeat MRI to evaluate for stability and to determine if anything has changed which may warrant a targeted biopsy in the operating room.  He is amenable to this plan.  MICHELLE today is relatively benign.  Now on Flomax for BPH related symptoms.  Discussed if we rule out prostate cancer that we would focus our discussion further on urinary symptom management which would include the addition of finasteride or workup for surgical intervention to include cystoscopy, uroflow data etc.  He may be interested in this moving forward.  His symptoms are relatively stable on tamsulosin and we will continue this for now      Diagnoses and all orders for this visit:    1. Elevated prostate specific antigen (PSA) (Primary)  -     MRI Prostate With & Without Contrast; Future    2. BPH with obstruction/lower urinary tract symptoms  - cont Flomax   - may consider surgical intervention in future (if PCa ruled out)         Follow Up:   Return in 4 weeks (on 12/23/2024) for Follow Up after Imaging.    I spent approximately 20 minutes providing clinical care for this patient; including review of patient's chart and provider documentation, face to face time spent with patient in examination room (obtaining history, performing physical exam, discussing diagnosis and management options), placing orders, and completing patient documentation.     Paul Moon MD  [facility] [place]

## 2024-12-11 ENCOUNTER — TELEPHONE (OUTPATIENT)
Dept: UROLOGY | Facility: CLINIC | Age: 59
End: 2024-12-11
Payer: MEDICAID

## 2024-12-11 NOTE — TELEPHONE ENCOUNTER
----- Message from Lissette TURNER sent at 12/11/2024 11:10 AM EST -----  The pt's follow up appt needs to be rescheduled.  His MRI is set up for 01/03/2025.  He needs to have this done before his appt.  Thanks.

## 2025-01-16 ENCOUNTER — TELEPHONE (OUTPATIENT)
Dept: UROLOGY | Facility: CLINIC | Age: 60
End: 2025-01-16
Payer: COMMERCIAL

## 2025-01-16 DIAGNOSIS — R97.20 ELEVATED PROSTATE SPECIFIC ANTIGEN (PSA): Primary | ICD-10-CM

## 2025-01-16 NOTE — TELEPHONE ENCOUNTER
LVM to patient that lab orders for Creatinine have been placed and to call [facility] to reschedule MRI.

## 2025-01-16 NOTE — TELEPHONE ENCOUNTER
[facility] called about this pt having an MRI tomorrow morning. Said he needed an updated creatinine level within the last 30 days. Pt may have to reschedule appt for tomorrow but are we able to go ahead and send orders in for pt to get them done and also get in touch with the pt to let them know to get the lab work done. thanks

## 2025-01-22 ENCOUNTER — LAB (OUTPATIENT)
Dept: LAB | Facility: HOSPITAL | Age: 60
End: 2025-01-22
Payer: COMMERCIAL

## 2025-01-22 DIAGNOSIS — R97.20 ELEVATED PROSTATE SPECIFIC ANTIGEN (PSA): ICD-10-CM

## 2025-01-22 LAB
CREAT SERPL-MCNC: 1.3 MG/DL (ref 0.76–1.27)
EGFRCR SERPLBLD CKD-EPI 2021: 63.7 ML/MIN/1.73

## 2025-01-22 PROCEDURE — 82565 ASSAY OF CREATININE: CPT

## 2025-01-22 PROCEDURE — 36415 COLL VENOUS BLD VENIPUNCTURE: CPT

## 2025-03-18 ENCOUNTER — TELEPHONE (OUTPATIENT)
Dept: UROLOGY | Facility: CLINIC | Age: 60
End: 2025-03-18
Payer: COMMERCIAL

## 2025-03-18 NOTE — TELEPHONE ENCOUNTER
Provider: DR HAJI    Caller: Elías Mccoy    Relationship to Patient: Self    Reason for Call: PT HAS DECIDED HE WOULD LIKE TO FINISH CARE WITH DR HAJI AS SELF PAY.    PT HAS REQUESTED FOR DR HAJI TO REVIEW HIS CHART AND PROVIDE A CARE PLAN OF WHAT EXACTLY IS NEEDED GOING FORWARD.    PLEASE CALL TO DISCUSS.    When was the patient last seen: 11/25/24

## 2025-03-18 NOTE — TELEPHONE ENCOUNTER
LVM for patient. Wanted to see if patient was planning on rescheduling his MRI that was cancelled due to being self pay now

## 2025-04-02 ENCOUNTER — TELEPHONE (OUTPATIENT)
Dept: UROLOGY | Facility: CLINIC | Age: 60
End: 2025-04-02

## 2025-04-02 NOTE — TELEPHONE ENCOUNTER
Caller: ELVIRA    Relationship: Provider    Best call back number: [phone]    What orders are you requesting (i.e. lab or imaging): MRI    In what timeframe would the patient need to come in: ASAP    Where will you receive your lab/imaging services: [facility]    Additional notes: ELVIRA WITH PCP OFFICE CALLED TO CONFIRM PT WOULD LIKE ORDERS PLACED WITH [facility].    PT ADVISED HE HAS HIGH COPAY AND OUT OF POCKET WOULD BE CHEAPER.    PLEASE CALL PT TO CONFIRM ORDERS HAVE BEEN PLACED.

## 2025-04-17 ENCOUNTER — TELEPHONE (OUTPATIENT)
Dept: UROLOGY | Facility: CLINIC | Age: 60
End: 2025-04-17
Payer: COMMERCIAL

## 2025-04-17 NOTE — TELEPHONE ENCOUNTER
[facility] called to let us know that we are not in network with the patients insurance, so we would need to send him to an imaging center that is in network with his insurance. Please advise thank you

## 2025-04-24 ENCOUNTER — TELEPHONE (OUTPATIENT)
Dept: UROLOGY | Facility: CLINIC | Age: 60
End: 2025-04-24

## 2025-04-24 NOTE — TELEPHONE ENCOUNTER
[facility] called to let us know that this patient's insurance did not cover for the schedule MRI, they canceled this and pt will need a new location to reschedule.

## 2025-05-17 ENCOUNTER — HOSPITAL ENCOUNTER (OUTPATIENT)
Facility: HOSPITAL | Age: 60
Discharge: HOME | End: 2025-05-17

## 2025-05-17 DIAGNOSIS — R97.20 ELEVATED PROSTATE SPECIFIC ANTIGEN (PSA): ICD-10-CM

## 2025-05-17 PROCEDURE — A9577 INJ MULTIHANCE: HCPCS | Performed by: STUDENT IN AN ORGANIZED HEALTH CARE EDUCATION/TRAINING PROGRAM

## 2025-05-17 PROCEDURE — 72197 MRI PELVIS W/O & W/DYE: CPT

## 2025-05-17 PROCEDURE — 25510000002 GADOBENATE DIMEGLUMINE 529 MG/ML SOLUTION: Performed by: STUDENT IN AN ORGANIZED HEALTH CARE EDUCATION/TRAINING PROGRAM

## 2025-05-17 RX ADMIN — GADOBENATE DIMEGLUMINE 20 ML: 529 INJECTION, SOLUTION INTRAVENOUS at 18:34

## 2025-06-10 ENCOUNTER — OFFICE VISIT (OUTPATIENT)
Dept: UROLOGY | Facility: CLINIC | Age: 60
End: 2025-06-10
Payer: COMMERCIAL

## 2025-06-10 DIAGNOSIS — R97.20 ELEVATED PROSTATE SPECIFIC ANTIGEN (PSA): Primary | ICD-10-CM

## 2025-06-10 DIAGNOSIS — N40.1 BPH WITH OBSTRUCTION/LOWER URINARY TRACT SYMPTOMS: ICD-10-CM

## 2025-06-10 DIAGNOSIS — N13.8 BPH WITH OBSTRUCTION/LOWER URINARY TRACT SYMPTOMS: ICD-10-CM

## 2025-06-10 PROCEDURE — 99214 OFFICE O/P EST MOD 30 MIN: CPT | Performed by: STUDENT IN AN ORGANIZED HEALTH CARE EDUCATION/TRAINING PROGRAM

## 2025-06-10 NOTE — PROGRESS NOTES
Follow Up Office Visit      Patient Name: Elías Mccoy  : 1965   MRN: [id number]     Chief Complaint:    Chief Complaint   Patient presents with    Elevated PSA       Referring Provider: No ref. provider found    History of Present Illness: Elías Mccoy is a 60 y.o. male who presents today for follow up of elevated PSA, BPH with lower urinary tract symptoms.  Last seen in office 2024.  Underwent a negative prostate biopsy in .  PSA was 6 at that time.  Confirm MDX negative for missed cancer.  Was previously on Flomax for lower urinary tract symptoms and BPH concerns.  He is no longer taking Flomax as he did not notice a benefit or change in symptoms.  PSA mitali to 6.7 recently.  He is here today with repeat MRI prostate further evaluation.  He has a stable appearing PI-RADS 3 lesion at the left peripheral zone which may represent a BPH nodule.  His IPSS is 14 with a mostly satisfied quality of life.  Starting to see some bother from nocturia weak stream and urgency.    Lab Results   Component Value Date    PSA 5.6 (H) 2023    PSA 6.350 (H) 2023    PSA 5.890 (H) 2022    PSA 6.2 (H) 2022    PSA 3.010 2020    PSA 2.900 2018     PSA 6.7 10/2024.     MRI PROSTATE 25  Lesion 1  PI-RADS assessment category: 3, Intermediate probability  Appearance on T2-weighted images: 3, heterogeneous signal intensity or non-circumscribed, rounded, moderate hypointensity  Appearance on diffusion-weighted images: 3, focal mildly/moderately hypointense on ADC and isointense/mildly hyperintense on high b value DWI  Appearance on dynamic post-contrast images: Negative - No early enhancement  Size: 1.2 x 0.9 x 0.7 cm axial T2 image 13 series 7  Side: Left  Zone: Junction of transition and peripheral zones  Level of prostate: Base  Location within transverse plane: Posterolateral  Extraprostatic extension: Does not abut the prostatic capsule     Seminal vesicles: None.     Lymph  nodes: No pelvic lymphadenopathy.     Osseous structures: No aggressive osseous lesion.     Additional findings: Colonic diverticulosis.     Impression:  1. Negative for PI-RADS 4 or 5 lesion.  2. Intermediate probability PI-RADS 3 lesion in the left prostate base centered at junction of transition and peripheral zones. No extracapsular extension. Remote 2022 prostate MRI not available for comparison. Our office staff will attempt to obtain   prior imaging, and an addendum will be made at that time.  3. Prostamegaly with BPH related change. Minimal linear signal changes in the peripheral zones favoring sequelae of prior prostatitis (PI-RADS 2).     PI-RADS 3 exam.    ADDENDUM #1  Addendum begins.     Prior prostate MRI made available from 5/12/2022. Lesion marked as PI-RADS 3 in the left prostate base stable, question possibility of exophytic BPH nodule.          Subjective      Review of System: Review of Systems   Genitourinary:  Positive for difficulty urinating, frequency and urgency.      I have reviewed the ROS documented by my clinical staff, I have updated appropriately and I agree. Paul Moon MD    I have reviewed and the following portions of the patient's history were updated as appropriate: past family history, past medical history, past social history, past surgical history and problem list.    Medications:     Current Outpatient Medications:     apixaban (ELIQUIS) 2.5 MG tablet tablet, Take 1 tablet by mouth Every 12 (Twelve) Hours., Disp: 180 tablet, Rfl: 3    DULoxetine (CYMBALTA) 60 MG capsule, TAKE 1 CAPSULE BY MOUTH EVERY DAY, Disp: 30 capsule, Rfl: 0    Allergies:   No Known Allergies    IPSS Questionnaire (AUA-7):  Over the past month…    1)  Incomplete Emptying:       How often have you had a sensation of not emptying you had the sensation of not emptying your bladder completely after you finished urinating?  2 - Less than half the time   2)  Frequency:       How often have you had the  urinate again less than two hours after you finished urinating?  1 - Less than 1 time in 5   3)  Intermittency:       How often have you found you stopped and started again several times when you urinated?   2 - Less than half the time   4) Urgency:      How often have you found it difficult to postpone urination?  2 - Less than half the time   5) Weak Stream:      How often have you had a weak urinary stream?  3 - About half the time   6) Straining:       How often have you had to push or strain to begin urination?  2 - Less than half the time   7) Nocturia:      How many times did you most typically get up to urinate from the time you went to bed at night until the time you got up in the morning?  2 - 2 times   Total Score:  14   The International Prostate Symptom Score (IPSS) is used to screen, diagnose, track symptoms of benign prostatic hyperplasia (BPH).   0-7 (Mild Symptoms) 8-19 (Moderate) 20-35 (Severe)   Quality of Life (QoL):  If you were to spend the rest of your life with your urinary condition just the way it is now, how would you feel about that? 2-Mostly Satisfied   Urine Leakage (Incontinence) 0-No Leakage         Objective     Physical Exam:   Vital Signs: There were no vitals filed for this visit.  There is no height or weight on file to calculate BMI.     Physical Exam  Constitutional:       Appearance: Normal appearance.   HENT:      Head: Normocephalic and atraumatic.      Nose: Nose normal.   Eyes:      Extraocular Movements: Extraocular movements intact.      Conjunctiva/sclera: Conjunctivae normal.      Pupils: Pupils are equal, round, and reactive to light.   Musculoskeletal:         General: Normal range of motion.      Cervical back: Normal range of motion and neck supple.   Skin:     General: Skin is warm and dry.      Findings: No lesion or rash.   Neurological:      General: No focal deficit present.      Mental Status: He is alert and oriented to person, place, and time. Mental status  is at baseline.   Psychiatric:         Mood and Affect: Mood normal.         Behavior: Behavior normal.         Labs:   Brief Urine Lab Results       None                 Lab Results   Component Value Date    GLUCOSE 89 02/25/2022    CALCIUM 9.1 02/25/2022     02/25/2022    K 4.3 02/25/2022    CO2 25 02/25/2022     02/25/2022    BUN 17 02/25/2022    CREATININE 1.30 (H) 01/22/2025    EGFRIFAFRI 87 02/25/2022    EGFRIFNONA 75 02/25/2022    BCR 15 02/25/2022       Lab Results   Component Value Date    WBC 6.64 04/01/2024    HGB 15.2 04/01/2024    HCT 46.5 04/01/2024    MCV 92 04/01/2024     04/01/2024       Images:   MRI Prostate With & Without Contrast  Addendum Date: 5/22/2025  ADDENDUM #1 Addendum begins. Prior prostate MRI made available from 5/12/2022. Lesion marked as PI-RADS 3 in the left prostate base stable, question possibility of exophytic BPH nodule. Addendum ends. Electronically Signed: Clarence Kirk MD  5/22/2025 8:36 AM EDT  Workstation ID: [id number] ORIGINAL REPORT: MRI PROSTATE W WO CONTRAST Date of Exam: 5/17/2025 5:49 PM EDT Indication: PSA 6.7, assess PIRADS.  Comparison: None available. Technique: Multiparametric 3T MRI of the pelvis was obtained prior to and after the intravenous administration of 20 mL of Multihance contrast. Imaging was performed per prostate protocol. Advanced 3D workstation manipulation and review of the data set was performed by the interpreting physician to further define anatomy and possible pathology. Images of areas of interest were created utilizing various techniques. These images were saved and transferred to PACS if significant. Findings: Prostate size: 5.1 x 4.5 x 5.3 cm, volume 60 cc. PSA 6.7. PSA density 0.11 Peripheral Zone: Minimal linear bandlike areas of T2 hypointense signal suggesting sequelae of prior prostatitis. Transition Zone: Enlarged and heterogeneous with circumscribed nodules consistent with benign prostatic hyperplasia. Small  prostate utricle cyst. Lesion 1 PI-RADS assessment category: 3, Intermediate probability Appearance on T2-weighted images: 3, heterogeneous signal intensity or non-circumscribed, rounded, moderate hypointensity Appearance on diffusion-weighted images: 3, focal mildly/moderately hypointense on ADC and isointense/mildly hyperintense on high b value DWI Appearance on dynamic post-contrast images: Negative - No early enhancement Size: 1.2 x 0.9 x 0.7 cm axial T2 image 13 series 7 Side: Left Zone: Junction of transition and peripheral zones Level of prostate: Base Location within transverse plane: Posterolateral Extraprostatic extension: Does not abut the prostatic capsule Seminal vesicles: None. Lymph nodes: No pelvic lymphadenopathy. Osseous structures: No aggressive osseous lesion. Additional findings: Colonic diverticulosis. Impression: 1. Negative for PI-RADS 4 or 5 lesion. 2. Intermediate probability PI-RADS 3 lesion in the left prostate base centered at junction of transition and peripheral zones. No extracapsular extension. Remote 2022 prostate MRI not available for comparison. Our office staff will attempt to obtain prior imaging, and an addendum will be made at that time. 3. Prostamegaly with BPH related change. Minimal linear signal changes in the peripheral zones favoring sequelae of prior prostatitis (PI-RADS 2). PI-RADS 3 exam. The prostate gland was segmented and the suspicious lesion(s) were annotated on the Experiment[employer] system for [employer]ro[employer] guidance. Electronically Signed: Clarence Kirk MD  5/21/2025 3:21 PM EDT  Workstation ID: [id number]    Result Date: 5/22/2025  Impression: 1. Negative for PI-RADS 4 or 5 lesion. 2. Intermediate probability PI-RADS 3 lesion in the left prostate base centered at junction of transition and peripheral zones. No extracapsular extension. Remote 2022 prostate MRI not available for comparison. Our office staff will attempt to obtain prior imaging, and an addendum will be made at  that time. 3. Prostamegaly with BPH related change. Minimal linear signal changes in the peripheral zones favoring sequelae of prior prostatitis (PI-RADS 2). PI-RADS 3 exam. The prostate gland was segmented and the suspicious lesion(s) were annotated on the [employer] system for UroNav guidance. Electronically Signed: Clarence Kirk MD  5/21/2025 3:21 PM EDT  Workstation ID: [id number]      Measures:   Tobacco:   Elías Mccoy  reports that he has never smoked. He has never used smokeless tobacco.       Assessment / Plan      Assessment/Plan:   60 y.o. male who presented today for follow up of elevated PSA.  MRI prostate demonstrates a 60 g gland consistent with BPH.  PI-RADS 3 lesion which appears stable on previous MRI in 2022.  We can safely avoid biopsy at this time.  PSA density 0.11, nonconcerning.  Has BPH symptoms and elects for continued monitoring rather than medical or surgical treatment.  We discussed additional workup options.  He will monitor symptoms and see me back in 6 months with repeat PSA.    Diagnoses and all orders for this visit:    1. Elevated prostate specific antigen (PSA) (Primary)  -     PSA Total+% Free (Serial); Future    2. BPH with obstruction/lower urinary tract symptoms  -     PSA Total+% Free (Serial); Future           Follow Up:   Return in about 6 months (around 12/10/2025) for Follow Up after Labs.    I spent approximately 30 minutes providing clinical care for this patient; including review of patient's chart and provider documentation, face to face time spent with patient in examination room (obtaining history, performing physical exam, discussing diagnosis and management options), placing orders, and completing patient documentation.     Paul Moon MD  [facility]